# Patient Record
Sex: FEMALE | Employment: OTHER | URBAN - METROPOLITAN AREA
[De-identification: names, ages, dates, MRNs, and addresses within clinical notes are randomized per-mention and may not be internally consistent; named-entity substitution may affect disease eponyms.]

---

## 2017-03-11 DIAGNOSIS — F41.1 GENERALIZED ANXIETY DISORDER: ICD-10-CM

## 2017-03-11 NOTE — LETTER
March 13, 2017      TO: Olive Drew  [address] [place] [id number]         Dear Ms. Olive Drew,  This letter is a reminder that you are overdue to see your Primary Care Provider for an Annual Visit and needed labs. You must be seen by your Primary Care Provider on a yearly basis and have appropriate labs drawn for continued care and prescription refills. Please call [phone] to schedule an appointment for an Annual Visit with  MIRNA MORROW MD.   LAST SEEN  3/15/16    You have been given a 30 day supply/refill of your citalopram  (CELEXA) 10 MG   while you get your clinic visit/labs completed.      Regards,  Primary Care Center

## 2017-03-13 RX ORDER — CITALOPRAM HYDROBROMIDE 10 MG/1
10 TABLET ORAL DAILY
Qty: 90 TABLET | Refills: 0 | Status: SHIPPED | OUTPATIENT
Start: 2017-03-13 | End: 2017-04-25

## 2017-03-13 NOTE — TELEPHONE ENCOUNTER
citalopram (CELEXA) 10 MG      Last Written Prescription Date:  3/15/16  Last Fill Quantity: 90,   # refills: 3  Last Office Visit : 3/15/16  Future Office visit:  NONE  OVERDUE     OVERDUE MD APPT. REMINDER LETTER SENT + 30 DAY RF

## 2017-04-25 ENCOUNTER — OFFICE VISIT (OUTPATIENT)
Dept: INTERNAL MEDICINE | Facility: CLINIC | Age: 72
End: 2017-04-25

## 2017-04-25 VITALS
DIASTOLIC BLOOD PRESSURE: 75 MMHG | BODY MASS INDEX: 39.51 KG/M2 | SYSTOLIC BLOOD PRESSURE: 108 MMHG | WEIGHT: 216 LBS | HEART RATE: 99 BPM

## 2017-04-25 DIAGNOSIS — Z78.0 ASYMPTOMATIC POSTMENOPAUSAL STATUS: ICD-10-CM

## 2017-04-25 DIAGNOSIS — Z11.59 NEED FOR HEPATITIS C SCREENING TEST: ICD-10-CM

## 2017-04-25 DIAGNOSIS — Z23 NEED FOR PROPHYLACTIC VACCINATION WITH TETANUS-DIPHTHERIA (TD): ICD-10-CM

## 2017-04-25 DIAGNOSIS — Z23 NEED FOR PROPHYLACTIC VACCINATION AGAINST STREPTOCOCCUS PNEUMONIAE (PNEUMOCOCCUS): ICD-10-CM

## 2017-04-25 DIAGNOSIS — F41.1 GENERALIZED ANXIETY DISORDER: ICD-10-CM

## 2017-04-25 DIAGNOSIS — Z12.31 VISIT FOR SCREENING MAMMOGRAM: ICD-10-CM

## 2017-04-25 LAB — HCV AB SERPL QL IA: NORMAL

## 2017-04-25 RX ORDER — CITALOPRAM HYDROBROMIDE 10 MG/1
10 TABLET ORAL DAILY
Qty: 90 TABLET | Refills: 3 | Status: SHIPPED | OUTPATIENT
Start: 2017-04-25 | End: 2018-06-04

## 2017-04-25 ASSESSMENT — PAIN SCALES - GENERAL: PAINLEVEL: NO PAIN (0)

## 2017-04-25 NOTE — PATIENT INSTRUCTIONS
Primary Care Center Medication Refill Request Information:  * Please contact your pharmacy regarding ANY request for medication refills.  ** [facility] Prescription Fax = [phone]  * Please allow 3 business days for routine medication refills.  * Please allow 5 business days for controlled substance medication refills.     Primary Care Center Test Result notification information:  *You will be notified within 7-10 days of your appointment day regarding the results of your test.  If you are on MyChart you will be notified as soon as the provider has reviewed the results and signed off on them.    Please schedule the following appointments:  Radiology (Imaging Center) [phone] (1st Floor [facility]

## 2017-04-25 NOTE — MR AVS SNAPSHOT
After Visit Summary   4/25/2017    Olive Drew    MRN: [id number]           Patient Information     Date Of Birth          1945        Visit Information        Provider Department      4/25/2017 3:30 PM Coby Cerrato MD [facility]        Today's Diagnoses     Asymptomatic postmenopausal status        Need for hepatitis C screening test        Visit for screening mammogram        Need for prophylactic vaccination against Streptococcus pneumoniae (pneumococcus)        Need for prophylactic vaccination with tetanus-diphtheria (TD)        Generalized anxiety disorder          Care Instructions    Primary Care Center Medication Refill Request Information:  * Please contact your pharmacy regarding ANY request for medication refills.  ** [facility] Prescription Fax = [phone]  * Please allow 3 business days for routine medication refills.  * Please allow 5 business days for controlled substance medication refills.     Primary Care Center Test Result notification information:  *You will be notified within 7-10 days of your appointment day regarding the results of your test.  If you are on MyChart you will be notified as soon as the provider has reviewed the results and signed off on them.    Please schedule the following appointments:  Radiology (Imaging Center) [phone] (1st [facility]         Follow-ups after your visit        Future tests that were ordered for you today     Open Future Orders        Priority Expected Expires Ordered    DEXA HIP/PELVIS/SPINE - Future Routine  4/25/2018 4/25/2017    Hepatitis C Screen Reflex to HCV RNA Quant and Genotype Routine 4/25/2017 4/25/2018 4/25/2017    MA SCREENING DIGITAL BILAT - Future  (s+30) Routine  4/25/2018 4/25/2017            Who to contact     Please call your clinic at [phone] to:    Ask questions about your health    Make or cancel appointments    Discuss your medicines    Learn about your test  results    Speak to your doctor   If you have compliments or concerns about an experience at your clinic, or if you wish to file a complaint, please contact [facility] Patient Relations at [phone] or email us at [email or web address]         Additional Information About Your Visit        Freepathhart Information     Poundworldt gives you secure access to your electronic health record. If you see a primary care provider, you can also send messages to your care team and make appointments. If you have questions, please call your primary care clinic.  If you do not have a primary care provider, please call [phone] and they will assist you.      DepoMed is an electronic gateway that provides easy, online access to your medical records. With [employer]oMed, you can request a clinic appointment, read your test results, renew a prescription or communicate with your care team.     To access your existing account, please contact your [facility] or call [phone] for assistance.        Care EveryWhere ID     This is your Care EveryWhere ID. This could be used by other organizations to access your [place] medical records  [id number]        Your Vitals Were     Pulse BMI (Body Mass Index)                99 39.51 kg/m2           Blood Pressure from Last 3 Encounters:   04/25/17 108/75   03/15/16 128/83   05/06/14 140/88    Weight from Last 3 Encounters:   04/25/17 98 kg (216 lb)   03/15/16 107.5 kg (237 lb)   05/06/14 106 kg (233 lb 11.2 oz)              We Performed the Following     Pneumococcal vaccine 13 valent PCV13 IM (Prevnar) [78709]     TDAP ( BOOSTRIX AGES 10-64)          Where to get your medicines      These medications were sent to [id number] - [facility] [address] AT [facility] [address]  [address] [place] [place] [address]    Hours:  24-hours Phone:  [phone]     citalopram 10 MG tablet           Primary Care Provider Office Phone # Fax #    Coby Ivy Menjivar [phone]       [address] [place] [address]        Thank you!     Thank you for choosing [facility]INIC  for your care. Our goal is always to provide you with excellent care. Hearing back from our patients is one way we can continue to improve our services. Please take a few minutes to complete the written survey that you may receive in the mail after your visit with us. Thank you!             Your Updated Medication List - Protect others around you: Learn how to safely use, store and throw away your medicines at www.disposemymeds.org.          This list is accurate as of: 4/25/17  3:37 PM.  Always use your most recent med list.                   Brand Name Dispense Instructions for use    ADVIL 200 MG tablet   Generic drug:  ibuprofen      Take 200 mg by mouth every 4 hours as needed.       citalopram 10 MG tablet    celeXA    90 tablet    Take 1 tablet (10 mg) by mouth daily LETTER SENT / MD APPT. NEEDED  FOR REFILLS       LORazepam 0.5 MG tablet    ATIVAN    15 tablet    Take 1 tablet (0.5 mg) by mouth every 6 hours as needed for anxiety       METAMUCIL 30.9 % Powd   Generic drug:  psyllium      Take  by mouth daily.       ondansetron 4 MG ODT tab    ZOFRAN-ODT    30 tablet    Take 1 tablet (4 mg) by mouth every 8 hours as needed for nausea       PRILOSEC PO      1 tablet daily.       TUMS 500 MG chewable tablet   Generic drug:  calcium carbonate      as needed.

## 2017-04-25 NOTE — NURSING NOTE
Chief Complaint   Patient presents with     Recheck Medication     Pt is here to follow up on her medications.      Rosy Lazo LPN April 25, 2017 3:11 PM

## 2017-04-26 NOTE — PROGRESS NOTES
Olive was seen today for annual physical, follow up of depression.  She is doing quite well, no concerns.  Would like to stay on the low dose of citalopram as it helps her mood significantly and no side effects noted.    Past, family, social history unchanged per Epic.   ROS: 10 point ROS neg other than the symptoms noted above in the HPI.    PHYSICAL EXAM:  B/P: 108/75, P: 99  Constitutional: no distress, comfortable, pleasant   Eyes: anicteric, normal extra-ocular movements   Ears, Nose and Throat: tympanic membranes clear, nose clear and free of lesions, throat clear, neck supple with full range of motion, no thyromegaly.   Cardiovascular: regular rate and rhythm, normal S1 and S2, no murmurs, rubs or gallops,  peripheral pulses full and symmetric   Respiratory: clear to auscultation, no wheezes or crackles, normal breath sounds   Gastrointestinal: positive bowel sounds, nontender, no hepatosplenomegaly, no masses   Musculoskeletal: full range of motion, no active joints   Skin: no concerning lesions, no jaundice   Neurological: normal gait, no tremor   Psychological: appropriate mood   Lymphatic: no cervical lymphadenopathy and no pedal edema    A/P  72 year old here for routine physical and depression follow up, doing well    Asymptomatic postmenopausal status  -     DEXA HIP/PELVIS/SPINE - Future; Future    Need for hepatitis C screening test  -     Hepatitis C Screen Reflex to HCV RNA Quant and Genotype; Future    Visit for screening mammogram  -     MA SCREENING DIGITAL BILAT - Future  (s+30); Future    Need for prophylactic vaccination against Streptococcus pneumoniae (pneumococcus)  -     Pneumococcal vaccine 13 valent PCV13 IM (Prevnar) [[id number]]    Need for prophylactic vaccination with tetanus-diphtheria (TD)  -     TDAP ( BOOSTRIX AGES 10-64)    Generalized anxiety disorder  -     citalopram (CELEXA) 10 MG tablet; Take 1 tablet (10 mg) by mouth daily LETTER SENT / MD APPT. NEEDED  FOR REFILLS    RTC one  year or prn    Tiara Mayes MD

## 2017-06-08 ENCOUNTER — RADIANT APPOINTMENT (OUTPATIENT)
Dept: MAMMOGRAPHY | Facility: CLINIC | Age: 72
End: 2017-06-08
Attending: INTERNAL MEDICINE

## 2017-06-08 DIAGNOSIS — Z12.31 VISIT FOR SCREENING MAMMOGRAM: ICD-10-CM

## 2018-06-04 DIAGNOSIS — F41.1 GENERALIZED ANXIETY DISORDER: ICD-10-CM

## 2018-06-05 ENCOUNTER — TELEPHONE (OUTPATIENT)
Dept: INTERNAL MEDICINE | Facility: CLINIC | Age: 73
End: 2018-06-05

## 2018-06-05 RX ORDER — CITALOPRAM HYDROBROMIDE 10 MG/1
10 TABLET ORAL DAILY
Qty: 30 TABLET | Refills: 0 | Status: SHIPPED | OUTPATIENT
Start: 2018-06-05 | End: 2018-07-13

## 2018-06-05 NOTE — TELEPHONE ENCOUNTER
Left a message for patient to call back to schedule annual clinic visit with Dr. Gerber Menjivar in Primary Care. Gave clinic number for rescheduling.

## 2018-06-05 NOTE — TELEPHONE ENCOUNTER
citalopram (CELEXA) 10 MG tablet  Last Written Prescription Date:  4/25/17  Last Fill Quantity: 90   # refills: 3  Last Office Visit : 4/25/17  Future Office visit:  None    Scheduling has been notified to contact the pt for appointment.

## 2018-06-05 NOTE — TELEPHONE ENCOUNTER
----- Message from Sandy Jenkins RN sent at 6/5/2018  4:34 PM CDT -----  Pt    RajendraOlive [[id number]]    [facility]    Gerber    Please call to schedule.    Patient is overdue for annual clinic visit - unless otherwise indicated patient needs to be scheduled with 1st available.    Patient may need labs and or imaging - please check with RN care coordinator.    Thanks    I do not need any follow up on the scheduling of this appointment unless the patient will no longer be receiving care in our clinic.

## 2018-07-13 ENCOUNTER — TELEPHONE (OUTPATIENT)
Dept: INTERNAL MEDICINE | Facility: CLINIC | Age: 73
End: 2018-07-13

## 2018-07-13 ENCOUNTER — OFFICE VISIT (OUTPATIENT)
Dept: INTERNAL MEDICINE | Facility: CLINIC | Age: 73
End: 2018-07-13
Payer: COMMERCIAL

## 2018-07-13 VITALS
SYSTOLIC BLOOD PRESSURE: 114 MMHG | HEART RATE: 81 BPM | RESPIRATION RATE: 18 BRPM | WEIGHT: 212.9 LBS | DIASTOLIC BLOOD PRESSURE: 78 MMHG | BODY MASS INDEX: 38.94 KG/M2

## 2018-07-13 DIAGNOSIS — Z13.220 SCREENING FOR LIPID DISORDERS: ICD-10-CM

## 2018-07-13 DIAGNOSIS — R11.14 BILIOUS VOMITING WITH NAUSEA: ICD-10-CM

## 2018-07-13 DIAGNOSIS — F41.1 GENERALIZED ANXIETY DISORDER: ICD-10-CM

## 2018-07-13 DIAGNOSIS — R11.14 BILIOUS VOMITING WITH NAUSEA: Primary | ICD-10-CM

## 2018-07-13 DIAGNOSIS — M25.512 CHRONIC LEFT SHOULDER PAIN: ICD-10-CM

## 2018-07-13 DIAGNOSIS — Z13.220 SCREENING FOR LIPID DISORDERS: Primary | ICD-10-CM

## 2018-07-13 DIAGNOSIS — Z12.31 ENCOUNTER FOR SCREENING MAMMOGRAM FOR BREAST CANCER: ICD-10-CM

## 2018-07-13 DIAGNOSIS — G89.29 CHRONIC LEFT SHOULDER PAIN: ICD-10-CM

## 2018-07-13 DIAGNOSIS — M19.90 ARTHRITIS: ICD-10-CM

## 2018-07-13 LAB
ALBUMIN SERPL-MCNC: 3.4 G/DL (ref 3.4–5)
ALP SERPL-CCNC: 82 U/L (ref 40–150)
ALT SERPL W P-5'-P-CCNC: 18 U/L (ref 0–50)
ANION GAP SERPL CALCULATED.3IONS-SCNC: 6 MMOL/L (ref 3–14)
AST SERPL W P-5'-P-CCNC: 18 U/L (ref 0–45)
BILIRUB SERPL-MCNC: 0.4 MG/DL (ref 0.2–1.3)
BUN SERPL-MCNC: 20 MG/DL (ref 7–30)
CALCIUM SERPL-MCNC: 8.7 MG/DL (ref 8.5–10.1)
CHLORIDE SERPL-SCNC: 108 MMOL/L (ref 94–109)
CHOLEST SERPL-MCNC: 242 MG/DL
CO2 SERPL-SCNC: 26 MMOL/L (ref 20–32)
CREAT SERPL-MCNC: 0.77 MG/DL (ref 0.52–1.04)
GFR SERPL CREATININE-BSD FRML MDRD: 74 ML/MIN/1.7M2
GLUCOSE SERPL-MCNC: 104 MG/DL (ref 70–99)
HDLC SERPL-MCNC: 49 MG/DL
LDLC SERPL CALC-MCNC: 166 MG/DL
NONHDLC SERPL-MCNC: 193 MG/DL
POTASSIUM SERPL-SCNC: 4.4 MMOL/L (ref 3.4–5.3)
PROT SERPL-MCNC: 7.2 G/DL (ref 6.8–8.8)
SODIUM SERPL-SCNC: 140 MMOL/L (ref 133–144)
TRIGL SERPL-MCNC: 133 MG/DL
TSH SERPL DL<=0.005 MIU/L-ACNC: 1.65 MU/L (ref 0.4–4)

## 2018-07-13 RX ORDER — ONDANSETRON 4 MG/1
4-8 TABLET, ORALLY DISINTEGRATING ORAL EVERY 8 HOURS PRN
Qty: 20 TABLET | Refills: 1 | Status: SHIPPED | OUTPATIENT
Start: 2018-07-13

## 2018-07-13 RX ORDER — ONDANSETRON 4 MG/1
4-8 TABLET, FILM COATED ORAL EVERY 8 HOURS PRN
Qty: 20 TABLET | Refills: 1 | Status: SHIPPED | OUTPATIENT
Start: 2018-07-13

## 2018-07-13 RX ORDER — TRAMADOL HYDROCHLORIDE 50 MG/1
50 TABLET ORAL EVERY 6 HOURS PRN
Qty: 15 TABLET | Refills: 5 | Status: SHIPPED | OUTPATIENT
Start: 2018-07-13 | End: 2020-10-23

## 2018-07-13 RX ORDER — CITALOPRAM HYDROBROMIDE 10 MG/1
10 TABLET ORAL DAILY
Qty: 90 TABLET | Refills: 3 | Status: SHIPPED | OUTPATIENT
Start: 2018-07-13 | End: 2019-06-28

## 2018-07-13 ASSESSMENT — PAIN SCALES - GENERAL: PAINLEVEL: MODERATE PAIN (5)

## 2018-07-13 NOTE — PATIENT INSTRUCTIONS
Primary Care Center Medication Refill Request Information:  * Please contact your pharmacy regarding ANY request for medication refills.  ** [facility] Prescription Fax = [phone]  * Please allow 3 business days for routine medication refills.  * Please allow 5 business days for controlled substance medication refills.     Primary Care Center Test Result notification information:  *You will be notified with in 7-10 days of your appointment day regarding the results of your test.  If you are on MyChart you will be notified as soon as the provider has reviewed the results and signed off on them.    Primary Care Center [phone]       Physical Therapy [phone]     Breast Center  [phone]  (2nd Floor)

## 2018-07-13 NOTE — TELEPHONE ENCOUNTER
I received a fax from the pharmacy stating ODT form of zofran not covered. Will send regular tab in the meantime and start PA for ODT tablets.    Message left for patient that PA will be started.        Prior Authorization Retail Medication Request    Medication/Dose: ondansetron ODT 4mg tablet  4-8mg Q8H PRN  ICD code (if different than what is on RX):  Bilious vomiting with nausea [R11.14]   Previously Tried and Failed:    Rationale:  Patient has had successful symptom management with ODT tablet, request to continue current therapy    Insurance Name:    Insurance ID:        Pharmacy Information (if different than what is on RX)  Name:    Phone:

## 2018-07-13 NOTE — MR AVS SNAPSHOT
"              After Visit Summary   7/13/2018    Olive Drew    MRN: [id number]           Patient Information     Date Of Birth          1945        Visit Information        Provider Department      7/13/2018 8:00 AM Coby Cerrato MD [facility]        Today's Diagnoses     Screening for lipid disorders    -  1    Generalized anxiety disorder        Bilious vomiting with nausea        Encounter for screening mammogram for breast cancer        Arthritis        Chronic left shoulder pain          Care Instructions    Primary Care Center Medication Refill Request Information:  * Please contact your pharmacy regarding ANY request for medication refills.  ** [facility] Prescription Fax = [phone]  * Please allow 3 business days for routine medication refills.  * Please allow 5 business days for controlled substance medication refills.     Primary Care Center Test Result notification information:  *You will be notified with in 7-10 days of your appointment day regarding the results of your test.  If you are on MyChart you will be notified as soon as the provider has reviewed the results and signed off on them.    Primary Care Center [phone]       Physical Therapy [phone]     Breast Center  [phone]  (2nd Floor)                  Follow-ups after your visit        Additional Services     PHYSICAL THERAPY REFERRAL       Please be aware that coverage of these services is subject to the terms and limitations of your health insurance plan.  Call member services at your health plan with any benefit or coverage questions.      **Note to Provider:  If you are referring outside of [place] for the therapy appointment, please list the name of the location in the \"special instructions\" above, print the referral and give to the patient to schedule the appointment.                  Future tests that were ordered for you today     Open Future Orders        Priority Expected Expires Ordered "    Mammogram, screening w linda (3D) Routine  7/13/2019 7/13/2018    Lipid panel reflex to direct LDL Fasting Routine 7/13/2018 7/27/2018 7/13/2018    Comprehensive metabolic panel Routine 7/13/2018 7/27/2018 7/13/2018    TSH with free T4 reflex Routine 7/13/2018 7/27/2018 7/13/2018            Who to contact     Please call your clinic at [phone] to:    Ask questions about your health    Make or cancel appointments    Discuss your medicines    Learn about your test results    Speak to your doctor            Additional Information About Your Visit        PriviaharSingular Information     V[employer] Critical gives you secure access to your electronic health record. If you see a primary care provider, you can also send messages to your care team and make appointments. If you have questions, please call your primary care clinic.  If you do not have a primary care provider, please call [phone] and they will assist you.      V[employer] Critical is an electronic gateway that provides easy, online access to your medical records. With [employer], you can request a clinic appointment, read your test results, renew a prescription or communicate with your care team.     To access your existing account, please contact your [facility] or call [phone] for assistance.        Care EveryWhere ID     This is your Care EveryWhere ID. This could be used by other organizations to access your [place] medical records  [id number]        Your Vitals Were     Pulse Respirations Breastfeeding? BMI (Body Mass Index)          81 18 No 38.94 kg/m2         Blood Pressure from Last 3 Encounters:   07/13/18 114/78   04/25/17 108/75   03/15/16 128/83    Weight from Last 3 Encounters:   07/13/18 96.6 kg (212 lb 14.4 oz)   04/25/17 98 kg (216 lb)   03/15/16 107.5 kg (237 lb)              We Performed the Following     PHYSICAL THERAPY REFERRAL          Today's Medication Changes          These changes are accurate as of 7/13/18  8:47 AM.  If  you have any questions, ask your nurse or doctor.               These medicines have changed or have updated prescriptions.        Dose/Directions    ondansetron 4 MG ODT tab   Commonly known as:  ZOFRAN ODT   This may have changed:  how much to take   Used for:  Bilious vomiting with nausea   Changed by:  Coby Cerrato MD        Dose:  4-8 mg   Take 1-2 tablets (4-8 mg) by mouth every 8 hours as needed for nausea   Quantity:  20 tablet   Refills:  1       traMADol 50 MG tablet   Commonly known as:  ULTRAM   This may have changed:    - how much to take  - reasons to take this   Used for:  Arthritis   Changed by:  Coby Cerrato MD        Dose:  50 mg   Take 1 tablet (50 mg) by mouth every 6 hours as needed for severe pain   Quantity:  15 tablet   Refills:  5         Stop taking these medicines if you haven't already. Please contact your care team if you have questions.     LORazepam 0.5 MG tablet   Commonly known as:  ATIVAN   Stopped by:  Coby Cerrato MD                Where to get your medicines      These medications were sent to [facility] [address] [place] [address] [address] [facility] [address]  [address] [facility] [address]     Phone:  [phone]     citalopram 10 MG tablet    ondansetron 4 MG ODT tab         Some of these will need a paper prescription and others can be bought over the counter.  Ask your nurse if you have questions.     Bring a paper prescription for each of these medications     traMADol 50 MG tablet               Information about OPIOIDS     PRESCRIPTION OPIOIDS: WHAT YOU NEED TO KNOW   We gave you an opioid (narcotic) pain medicine. It is important to manage your pain, but opioids are not always the best choice. You should first try all the other options your care team gave you. Take this medicine for as short a time (and as few doses) as possible.     These medicines have risks:    DO NOT drive  when on new or higher doses of pain medicine. These medicines can affect your alertness and reaction times, and you could be arrested for driving under the influence (DUI). If you need to use opioids long-term, talk to your care team about driving.    DO NOT operate heave machinery    DO NOT do any other dangerous activities while taking these medicines.     DO NOT drink any alcohol while taking these medicines.      If the opioid prescribed includes acetaminophen, DO NOT take with any other medicines that contain acetaminophen. Read all labels carefully. Look for the word  acetaminophen  or  Tylenol.  Ask your pharmacist if you have questions or are unsure.    You can get addicted to pain medicines, especially if you have a history of addiction (chemical, alcohol or substance dependence). Talk to your care team about ways to reduce this risk.    Store your pills in a secure place, locked if possible. We will not replace any lost or stolen medicine. If you don t finish your medicine, please throw away (dispose) as directed by your pharmacist. The [place] Pollution Control Agency has more information about safe disposal: https://www.pca.[facility].mn.us/living-green/managing-unwanted-medications.     All opioids tend to cause constipation. Drink plenty of water and eat foods that have a lot of fiber, such as fruits, vegetables, prune juice, apple juice and high-fiber cereal. Take a laxative (Miralax, milk of magnesia, Colace, Senna) if you don t move your bowels at least every other day.          Primary Care Provider Office Phone # Fax #    Coby Menjivar [phone]       0 [address] [address]        Equal Access to Services     [facility]: Hadii jessie cross Sovi, waaxda luqadaha, qaybta kaalmada wilfrid hay. So [facility] [phone].    ATENCIÓN: Si habla español, tiene a tena disposición servicios gratuitos de asistencia  lingüísticaChapis Machado al [phone].    We comply with applicable federal civil rights laws and [place] laws. We do not discriminate on the basis of race, color, national origin, age, disability, sex, sexual orientation, or gender identity.            Thank you!     Thank you for choosing [facility] PRIMARY CARE CLINIC  for your care. Our goal is always to provide you with excellent care. Hearing back from our patients is one way we can continue to improve our services. Please take a few minutes to complete the written survey that you may receive in the mail after your visit with us. Thank you!             Your Updated Medication List - Protect others around you: Learn how to safely use, store and throw away your medicines at www.disposemymeds.org.          This list is accurate as of 7/13/18  8:47 AM.  Always use your most recent med list.                   Brand Name Dispense Instructions for use Diagnosis    ADVIL 200 MG tablet   Generic drug:  ibuprofen      Take 200 mg by mouth every 4 hours as needed.        citalopram 10 MG tablet    celeXA    90 tablet    Take 1 tablet (10 mg) by mouth daily    Generalized anxiety disorder       METAMUCIL 30.9 % Powd   Generic drug:  psyllium      Take  by mouth daily.        ondansetron 4 MG ODT tab    ZOFRAN ODT    20 tablet    Take 1-2 tablets (4-8 mg) by mouth every 8 hours as needed for nausea    Bilious vomiting with nausea       PRILOSEC PO      1 tablet daily.    Generalized anxiety disorder, Vomiting alone       traMADol 50 MG tablet    ULTRAM    15 tablet    Take 1 tablet (50 mg) by mouth every 6 hours as needed for severe pain    Arthritis       TUMS 500 MG chewable tablet   Generic drug:  calcium carbonate      as needed.    Generalized anxiety disorder, Vomiting alone

## 2018-07-13 NOTE — NURSING NOTE
Chief Complaint   Patient presents with     Refill Request     Patient is here for follow up and medication refills       Gregor Wheat CMA (AAMA) at 8:03 AM on 7/13/2018

## 2018-07-16 NOTE — TELEPHONE ENCOUNTER
PA Initiation    Medication: Zofran 4mg ODT  Insurance Company: BCBS Platinum Blue - Phone [phone] Fax [phone]  Pharmacy Filling the Rx: [facility] [id number] [place], [place] [address] [address] AT [facility] [address]  Filling Pharmacy Phone: [phone]  Filling Pharmacy Fax: [phone]  Start Date: 7/16/2018

## 2018-07-18 NOTE — TELEPHONE ENCOUNTER
Prior Authorization Approval    Authorization Effective Date: 4/19/2018  Authorization Expiration Date: 7/18/2019  Medication: Zofran 4mg ODT - PA APPROVED  Approved Dose/Quantity:  20/4   Reference #: [id number]   Insurance Company: BCBS Platinum Blue - Phone [phone] Fax [phone]  Expected CoPay:       CoPay Card Available:      Foundation Assistance Needed:    Which Pharmacy is filling the prescription (Not needed for infusion/clinic administered): [facility] [address] [place] [address] [address] AT [facility] OF [address]  Pharmacy Notified: Yes  Patient Notified: Yes    --Verbal approval from insurance

## 2018-07-19 NOTE — PROGRESS NOTES
Olive was seen today for refill request and follow up of nausea, Anxiety and Arthritis.     Total time spent 25 minutes.  More than 50% of the time spent with Ms. Drew on counseling / coordinating her care.    She feels overall her mood is much better on citalopram. She also believes that nausea is actually a symptom of her anxiety; a GI workup done previously was negative.  She does still use zofran as needed, but rarely.    She would like a refill of tramadol for arthritis pain and also her left shoulder has been acting up lately.  We discussed a PT referral.      Finally, she is due for a mammogram and she would also like her cholesterol rechecked.     On exam  B/P: 114/78, P: 81, R: 18  Cor RRR  Lungs CTA  Ext no edema      A/P    Screening for lipid disorders  -     Lipid panel reflex to direct LDL Fasting; Future  -     Comprehensive metabolic panel; Future  -     TSH with free T4 reflex; Future    Generalized anxiety disorder  -     citalopram (CELEXA) 10 MG tablet; Take 1 tablet (10 mg) by mouth daily    Bilious vomiting with nausea  -     ondansetron (ZOFRAN ODT) 4 MG ODT tab; Take 1-2 tablets (4-8 mg) by mouth every 8 hours as needed for nausea    Encounter for screening mammogram for breast cancer  -     Mammogram, screening w linda (3D); Future    Arthritis  -     traMADol (ULTRAM) 50 MG tablet; Take 1 tablet (50 mg) by mouth every 6 hours as needed for severe pain    Chronic left shoulder pain  -     PHYSICAL THERAPY REFERRAL    --Coby Mayes MD

## 2018-07-31 ENCOUNTER — RADIANT APPOINTMENT (OUTPATIENT)
Dept: MAMMOGRAPHY | Facility: CLINIC | Age: 73
End: 2018-07-31
Attending: INTERNAL MEDICINE
Payer: COMMERCIAL

## 2018-07-31 DIAGNOSIS — Z12.31 ENCOUNTER FOR SCREENING MAMMOGRAM FOR BREAST CANCER: ICD-10-CM

## 2019-06-25 ASSESSMENT — ENCOUNTER SYMPTOMS
SMELL DISTURBANCE: 1
HALLUCINATIONS: 0
DISTURBANCES IN COORDINATION: 0
NUMBNESS: 0
POLYDIPSIA: 0
EYE WATERING: 0
TROUBLE SWALLOWING: 0
MEMORY LOSS: 1
EYE PAIN: 0
MYALGIAS: 1
INCREASED ENERGY: 1
PARALYSIS: 0
BACK PAIN: 1
WEIGHT LOSS: 1
DIZZINESS: 0
JOINT SWELLING: 1
WEIGHT GAIN: 0
CHILLS: 1
FEVER: 0
MUSCLE WEAKNESS: 0
WEAKNESS: 0
ALTERED TEMPERATURE REGULATION: 1
POLYPHAGIA: 0
DECREASED APPETITE: 0
HOARSE VOICE: 0
NECK MASS: 1
NECK PAIN: 1
SINUS CONGESTION: 0
SORE THROAT: 0
TREMORS: 0
MUSCLE CRAMPS: 1
DOUBLE VISION: 1
NIGHT SWEATS: 1
TASTE DISTURBANCE: 1
HEADACHES: 1
EYE IRRITATION: 1
SPEECH CHANGE: 0
EYE REDNESS: 0
SINUS PAIN: 1
LOSS OF CONSCIOUSNESS: 0
FATIGUE: 1
STIFFNESS: 1
ARTHRALGIAS: 1
SEIZURES: 0

## 2019-06-25 ASSESSMENT — ACTIVITIES OF DAILY LIVING (ADL)
IN_THE_PAST_7_DAYS,_DID_YOU_NEED_HELP_FROM_OTHERS_TO_PERFORM_EVERYDAY_ACTIVITIES_SUCH_AS_EATING,_GETTING_DRESSED,_GROOMING,_BATHING,_WALKING,_OR_USING_THE_TOILET: N
IN_THE_PAST_7_DAYS,_DID_YOU_NEED_HELP_FROM_OTHERS_TO_TAKE_CARE_OF_THINGS_SUCH_AS_LAUNDRY_AND_HOUSEKEEPING,_BANKING,_SHOPPING,_USING_THE_TELEPHONE,_FOOD_PREPARATION,_TRANSPORTATION,_OR_TAKING_YOUR_OWN_MEDICATIONS?: N

## 2019-06-28 ENCOUNTER — OFFICE VISIT (OUTPATIENT)
Dept: INTERNAL MEDICINE | Facility: CLINIC | Age: 74
End: 2019-06-28
Payer: COMMERCIAL

## 2019-06-28 ENCOUNTER — ANCILLARY PROCEDURE (OUTPATIENT)
Dept: GENERAL RADIOLOGY | Facility: CLINIC | Age: 74
End: 2019-06-28
Attending: INTERNAL MEDICINE
Payer: COMMERCIAL

## 2019-06-28 VITALS
HEIGHT: 62 IN | BODY MASS INDEX: 37.91 KG/M2 | SYSTOLIC BLOOD PRESSURE: 112 MMHG | HEART RATE: 75 BPM | DIASTOLIC BLOOD PRESSURE: 73 MMHG | WEIGHT: 206 LBS | OXYGEN SATURATION: 96 %

## 2019-06-28 DIAGNOSIS — M89.9 DISORDER OF BONE: ICD-10-CM

## 2019-06-28 DIAGNOSIS — Z13.220 SCREENING FOR LIPID DISORDERS: ICD-10-CM

## 2019-06-28 DIAGNOSIS — M25.441 FINGER JOINT SWELLING, RIGHT: ICD-10-CM

## 2019-06-28 DIAGNOSIS — F41.1 GENERALIZED ANXIETY DISORDER: ICD-10-CM

## 2019-06-28 DIAGNOSIS — M89.9 DISORDER OF BONE: Primary | ICD-10-CM

## 2019-06-28 LAB
ALBUMIN SERPL-MCNC: 3.6 G/DL (ref 3.4–5)
ALP SERPL-CCNC: 84 U/L (ref 40–150)
ALT SERPL W P-5'-P-CCNC: 23 U/L (ref 0–50)
ANION GAP SERPL CALCULATED.3IONS-SCNC: 5 MMOL/L (ref 3–14)
AST SERPL W P-5'-P-CCNC: 22 U/L (ref 0–45)
BILIRUB SERPL-MCNC: 0.6 MG/DL (ref 0.2–1.3)
BUN SERPL-MCNC: 17 MG/DL (ref 7–30)
CALCIUM SERPL-MCNC: 8.9 MG/DL (ref 8.5–10.1)
CHLORIDE SERPL-SCNC: 106 MMOL/L (ref 94–109)
CHOLEST SERPL-MCNC: 263 MG/DL
CO2 SERPL-SCNC: 27 MMOL/L (ref 20–32)
CREAT SERPL-MCNC: 0.7 MG/DL (ref 0.52–1.04)
CRP SERPL-MCNC: 6.7 MG/L (ref 0–8)
ERYTHROCYTE [SEDIMENTATION RATE] IN BLOOD BY WESTERGREN METHOD: 15 MM/H (ref 0–30)
GFR SERPL CREATININE-BSD FRML MDRD: 87 ML/MIN/{1.73_M2}
GLUCOSE SERPL-MCNC: 95 MG/DL (ref 70–99)
HDLC SERPL-MCNC: 57 MG/DL
LDLC SERPL CALC-MCNC: 178 MG/DL
NONHDLC SERPL-MCNC: 206 MG/DL
POTASSIUM SERPL-SCNC: 4 MMOL/L (ref 3.4–5.3)
PROT SERPL-MCNC: 7.4 G/DL (ref 6.8–8.8)
RHEUMATOID FACT SER NEPH-ACNC: <20 IU/ML (ref 0–20)
SODIUM SERPL-SCNC: 138 MMOL/L (ref 133–144)
TRIGL SERPL-MCNC: 138 MG/DL
TSH SERPL DL<=0.005 MIU/L-ACNC: 1.88 MU/L (ref 0.4–4)

## 2019-06-28 PROCEDURE — 86431 RHEUMATOID FACTOR QUANT: CPT | Performed by: INTERNAL MEDICINE

## 2019-06-28 RX ORDER — CITALOPRAM HYDROBROMIDE 10 MG/1
10 TABLET ORAL DAILY
Qty: 90 TABLET | Refills: 3 | Status: SHIPPED | OUTPATIENT
Start: 2019-06-28 | End: 2020-07-02

## 2019-06-28 ASSESSMENT — PAIN SCALES - GENERAL: PAINLEVEL: NO PAIN (0)

## 2019-06-28 ASSESSMENT — MIFFLIN-ST. JEOR: SCORE: 1389.72

## 2019-06-28 NOTE — NURSING NOTE
Chief Complaint   Patient presents with     Physical     here for annual physical       LIDIA Orellana 9:10 AM on 6/28/2019.

## 2019-06-28 NOTE — PATIENT INSTRUCTIONS
[facility] Medication Refill Request Information:  * Please contact your pharmacy regarding ANY request for medication refills.  ** [facility] Prescription Fax = [phone]  * Please allow 3 business days for routine medication refills.  * Please allow 5 business days for controlled substance medication refills.     [facility] Test Result notification information:  *You will be notified with in 7-10 days of your appointment day regarding the results of your test.  If you are on MyChart you will be notified as soon as the provider has reviewed the results and signed off on them.    [facility]: [phone]

## 2019-07-04 NOTE — PROGRESS NOTES
"Olive was seen today for yearly physical and follow up of anxiety and low bone density.  She endorses feel anxious from time to time but it is much better since being on Celexa.  She also has some diffuse joint pain and in particular, there is a mildly tender swelling in the digit of her right hand.  She also feels warm on occasion; could be related to anxiety but will also check thyroid function. She has intentionally lost a bit of weight through watching her diet and increasing exercise.    No changes to past, family, or social history. ROS: 10 point ROS neg other than the symptoms noted above in the HPI.    PHYSICAL EXAM:  /73   Pulse 75   Ht 1.562 m (5' 1.5\")   Wt 93.4 kg (206 lb)   SpO2 96%   BMI 38.29 kg/m    Constitutional: no distress, comfortable, pleasant   Eyes: anicteric, normal extra-ocular movements   Ears, Nose and Throat: tympanic membranes clear, nose clear and free of lesions, throat clear, neck supple with full range of motion, no thyromegaly.   Cardiovascular: regular rate and rhythm, normal S1 and S2, no murmurs, rubs or gallops, peripheral pulses full and symmetric   Respiratory: clear to auscultation, no wheezes or crackles, normal breath sounds   Gastrointestinal: positive bowel sounds, nontender, no hepatosplenomegaly, no masses   Musculoskeletal: knees full range of motion, no effusion  Skin: no concerning lesions, no jaundice   Neurological: normal gait, no tremor   Psychological: appropriate mood   Lymphatic: no cervical lymphadenopathy and no pedal edema      A/P  74 year old female here for PE and f/u    Disorder of bone  -     DEXA HIP/PELVIS/SPINE - Future; Future    Generalized anxiety disorder  -     citalopram (CELEXA) 10 MG tablet; Take 1 tablet (10 mg) by mouth daily  -     TSH with free T4 reflex; Future    Screening for lipid disorders  -     Lipid Profile FASTING; Future  -     Comprehensive metabolic panel; Future    Finger joint swelling, right  -     XR Hand Right " G/E 3 Views; Future  -     Rheumatoid factor; Future  -     Erythrocyte sedimentation rate; Future  -     CRP inflammation; Future  -     XR Hand Right G/E 3 Views; Future    RTC based on above results, or for annual physical in one year    -- Coby Mayes MD

## 2019-07-05 ENCOUNTER — ANCILLARY PROCEDURE (OUTPATIENT)
Dept: BONE DENSITY | Facility: CLINIC | Age: 74
End: 2019-07-05
Attending: INTERNAL MEDICINE
Payer: COMMERCIAL

## 2019-07-05 DIAGNOSIS — M89.9 DISORDER OF BONE: ICD-10-CM

## 2019-10-04 ENCOUNTER — HEALTH MAINTENANCE LETTER (OUTPATIENT)
Age: 74
End: 2019-10-04

## 2019-11-11 ENCOUNTER — ANCILLARY PROCEDURE (OUTPATIENT)
Dept: MAMMOGRAPHY | Facility: CLINIC | Age: 74
End: 2019-11-11
Attending: INTERNAL MEDICINE
Payer: COMMERCIAL

## 2019-11-11 DIAGNOSIS — Z12.31 VISIT FOR SCREENING MAMMOGRAM: ICD-10-CM

## 2020-06-29 DIAGNOSIS — F41.1 GENERALIZED ANXIETY DISORDER: ICD-10-CM

## 2020-07-02 RX ORDER — CITALOPRAM HYDROBROMIDE 10 MG/1
10 TABLET ORAL DAILY
Qty: 90 TABLET | Refills: 0 | Status: SHIPPED | OUTPATIENT
Start: 2020-07-02 | End: 2020-09-29

## 2020-07-02 NOTE — TELEPHONE ENCOUNTER
CITALOPRAM 10MG TABLETS     Last Written Prescription Date:  6/28/2019  Last Fill Quantity: 90,   # refills: 3  Last Office Visit : 6/28/2019  Future Office visit:  None    Routing refill request to provider for review/approval because:  Drug-Drug Interaction (HIGH)    Tramadol & Citalopram  Drug-Drug Interaction (HIGH) Tramadol & Ibuprofen        Refer to clinic for review and approval of med?  Office Visit Due        90 day supply pended for approval  Clinic coordinator notified         Luisa Khan RN  Central Triage Red Flags/Med Refills

## 2020-07-02 NOTE — TELEPHONE ENCOUNTER
Tried calling patient, no answered. Left message with Primary Care clinic number requesting patient to call back to schedule annual appointment with PCP Dr. Gerber Menjivar.

## 2020-09-25 DIAGNOSIS — F41.1 GENERALIZED ANXIETY DISORDER: ICD-10-CM

## 2020-09-28 ENCOUNTER — TELEPHONE (OUTPATIENT)
Dept: INTERNAL MEDICINE | Facility: CLINIC | Age: 75
End: 2020-09-28

## 2020-09-28 NOTE — TELEPHONE ENCOUNTER
[facility] Call Center    Phone Message    May a detailed message be left on voicemail: yes     Reason for Call: Medication Refill Request    Has the patient contacted the pharmacy for the refill? Yes   Name of medication being requested: citalopram (CELEXA) 10 MG tablet   Provider who prescribed the medication: Dr Mayes  Pharmacy: [id number] [place], [place] - [address] AT [facility] [address]   Date medication is needed: ASAP - she is out.         Action Taken: Message routed to:  [facility] PCC    Travel Screening: Not Applicable

## 2020-09-29 ENCOUNTER — DOCUMENTATION ONLY (OUTPATIENT)
Dept: CARE COORDINATION | Facility: CLINIC | Age: 75
End: 2020-09-29

## 2020-09-29 RX ORDER — CITALOPRAM HYDROBROMIDE 10 MG/1
TABLET ORAL
Qty: 90 TABLET | Refills: 0 | Status: SHIPPED | OUTPATIENT
Start: 2020-09-29 | End: 2020-10-23

## 2020-09-29 NOTE — TELEPHONE ENCOUNTER
Patient has an appointment on 10/23/20. Last appointment 6/28/19. Citalopram refilled.    Kaycee Sawyer RN ([place])

## 2020-10-23 ENCOUNTER — OFFICE VISIT (OUTPATIENT)
Dept: INTERNAL MEDICINE | Facility: CLINIC | Age: 75
End: 2020-10-23
Payer: COMMERCIAL

## 2020-10-23 VITALS
SYSTOLIC BLOOD PRESSURE: 123 MMHG | WEIGHT: 209.2 LBS | HEART RATE: 91 BPM | DIASTOLIC BLOOD PRESSURE: 88 MMHG | OXYGEN SATURATION: 94 % | BODY MASS INDEX: 38.89 KG/M2

## 2020-10-23 DIAGNOSIS — F41.1 GENERALIZED ANXIETY DISORDER: ICD-10-CM

## 2020-10-23 DIAGNOSIS — M19.90 ARTHRITIS: ICD-10-CM

## 2020-10-23 DIAGNOSIS — B00.1 COLD SORE: Primary | ICD-10-CM

## 2020-10-23 PROCEDURE — G0439 PPPS, SUBSEQ VISIT: HCPCS | Performed by: INTERNAL MEDICINE

## 2020-10-23 RX ORDER — TRAMADOL HYDROCHLORIDE 50 MG/1
50 TABLET ORAL EVERY 6 HOURS PRN
Qty: 15 TABLET | Refills: 5 | Status: SHIPPED | OUTPATIENT
Start: 2020-10-23

## 2020-10-23 RX ORDER — VALACYCLOVIR HYDROCHLORIDE 1 G/1
2000 TABLET, FILM COATED ORAL 2 TIMES DAILY
Qty: 4 TABLET | Refills: 3 | Status: SHIPPED | OUTPATIENT
Start: 2020-10-23 | End: 2020-10-23

## 2020-10-23 RX ORDER — CITALOPRAM HYDROBROMIDE 10 MG/1
10 TABLET ORAL DAILY
Qty: 90 TABLET | Refills: 3 | Status: SHIPPED | OUTPATIENT
Start: 2020-10-23 | End: 2022-01-21

## 2020-10-23 RX ORDER — VALACYCLOVIR HYDROCHLORIDE 1 G/1
2000 TABLET, FILM COATED ORAL 2 TIMES DAILY
Qty: 5 TABLET | Refills: 3 | Status: SHIPPED | OUTPATIENT
Start: 2020-10-23 | End: 2020-10-24

## 2020-10-23 ASSESSMENT — PAIN SCALES - GENERAL: PAINLEVEL: NO PAIN (0)

## 2020-10-23 NOTE — PROGRESS NOTES
Medicare Annual Wellness Visit Previsit note    This 75 year old year old female presents for a  Medicare Wellness Exam.           Medical Care     Have you been to an ER or a hospital in the last year? No  What other specialists or organizations are involved in your medical care?  [facility]  Current providers sharing in care for this patient include:  Patient Care Team       Relationship Specialty Notifications Start End    Coby Cerrato MD PCP - General Internal Medicine  11/10/11     Phone: [phone] Pager: [phone] Fax: [phone] [address] [address]    Coby Cerrato MD Assigned PCP   5/21/20     Phone: [phone] Pager: [phone] Fax: [phone] [address] [address]                 Social History     Marital Status:Single  Who lives in your household? Self  Does your home have any of the following safety concerns? Loose rugs in the hallway, no grab bars in the bathroom, no handrails on the stairs or have poorly lit areas?  No  Do you feel threatened or controlled by a partner, ex-partner or anyone in your life? No  Has anyone hurt you physically, for example by pushing, hitting, slapping or kicking you   or forcing you to have sex? No  Do you need help with the phone, transportation, shopping, preparing meals, housework, laundry, medications or managing money? No   Have you noticed any hearing difficulties? Yes       Risk Behaviors and Healthy Habits     How many servings of fruits and vegetables do you eat a day? 3-4  How often do you exercise and what do you do? 45-70 minutes 7 days a week  Do you frequently ride without a seatbelt? No  Do you use tobacco?  No  Do you use any other drugs? No       Do you use alcohol? No        Sexual Health     Are you sexually active? No   If yes, with men, women, or both? N/A  If yes, do you more than one current partner?N/A  If yes, do you use condoms?  "N/A  Have you had any sexually transmitted infections? No  Any sexual concerns? No       FOR WOMEN ONLY  What year did you stop having periods? 1997  Any vaginal bleeding in the last year? No  Have you ever had an abnormal Pap smear? No    PHQ-2 Score:     PHQ-2 ( 1999 [employer]) 10/23/2020 6/28/2019   Q1: Little interest or pleasure in doing things 0 0   Q2: Feeling down, depressed or hopeless 0 0   PHQ-2 Score 0 0     Fall Risk Screening:  FALL RISK ASSESSMENT 10/23/2020   Fallen 2 or more times in the past year? No   Any fall with injury in the past year? No       Lexii Fermin, EMT at 9:43 AM sign on 10/23/2020    Olive was seen today for medicare wellness visit.  She is doing \"great\" and 10 point ROS was negative.  She is getting ready to leave for a vacation in [place].  She requests refills of a few medications, would like to continue citalopram for anxiety as well as tramadol and valtrex prn.    No changes to past, family, or social history.  Her lab work last year was all very favorable except cholesterol.  However she is not willing to go on a statin, so I recommend dietary and lifestyle changes and we can recheck next time.    PHYSICAL EXAM:  /88 (BP Location: Right arm, Patient Position: Sitting, Cuff Size: Adult Large)   Pulse 91   Wt 94.9 kg (209 lb 3.2 oz)   SpO2 94%   Breastfeeding No   BMI 38.89 kg/m    Constitutional: no distress, comfortable, pleasant   Eyes: anicteric, normal extra-ocular movements   Ears, Nose and Throat: tympanic membranes clear, nose clear and free of lesions, throat clear, neck supple with full range of motion, no thyromegaly.   Cardiovascular: regular rate and rhythm, normal S1 and S2, no murmurs, rubs or gallops,  peripheral pulses full and symmetric   Respiratory: clear to auscultation, no wheezes or crackles, normal breath sounds   Gastrointestinal: positive bowel sounds, nontender, no hepatosplenomegaly, no masses   Musculoskeletal: full range of motion, no " edema   Skin: no concerning lesions, no jaundice   Neurological: cranial nerves intact, normal strength and sensation, reflexes at patella and biceps normal, normal gait, no tremor   Psychological: appropriate mood   Lymphatic: no cervical, axillary or inguinal lymphadenopathy      A/P  75 year old here for wellness visit.  From a HCM standpoint, she is due for colonoscopy or other alternative colon cancer screening, however she wanted to wait until she returned from her vacation in [place] and also when the pandemic infections start to decline.   We could order FIT testing or cologuard if the procedure becomes a barrier.    Cold sore  -     valACYclovir (VALTREX) 1000 mg tablet; Take 2 tablets (2,000 mg) by mouth 2 times daily for 1 day At onset of a cold sore    Arthritis  -     traMADol (ULTRAM) 50 MG tablet; Take 1 tablet (50 mg) by mouth every 6 hours as needed for severe pain    Generalized anxiety disorder  -     citalopram (CELEXA) 10 MG tablet; Take 1 tablet (10 mg) by mouth daily    RTC in one year or sooner prn    Coby Menjivar MD

## 2021-01-15 ENCOUNTER — HEALTH MAINTENANCE LETTER (OUTPATIENT)
Age: 76
End: 2021-01-15

## 2021-03-05 ENCOUNTER — IMMUNIZATION (OUTPATIENT)
Dept: NURSING | Facility: CLINIC | Age: 76
End: 2021-03-05
Payer: COMMERCIAL

## 2021-03-05 PROCEDURE — 0001A PR COVID VAC PFIZER DIL RECON 30 MCG/0.3 ML IM: CPT

## 2021-03-05 PROCEDURE — 91300 PR COVID VAC PFIZER DIL RECON 30 MCG/0.3 ML IM: CPT

## 2021-03-26 ENCOUNTER — IMMUNIZATION (OUTPATIENT)
Dept: NURSING | Facility: CLINIC | Age: 76
End: 2021-03-26
Attending: INTERNAL MEDICINE
Payer: COMMERCIAL

## 2021-03-26 PROCEDURE — 0002A PR COVID VAC PFIZER DIL RECON 30 MCG/0.3 ML IM: CPT

## 2021-03-26 PROCEDURE — 91300 PR COVID VAC PFIZER DIL RECON 30 MCG/0.3 ML IM: CPT

## 2021-09-03 ENCOUNTER — MYC MEDICAL ADVICE (OUTPATIENT)
Dept: INTERNAL MEDICINE | Facility: CLINIC | Age: 76
End: 2021-09-03

## 2021-09-12 ENCOUNTER — HEALTH MAINTENANCE LETTER (OUTPATIENT)
Age: 76
End: 2021-09-12

## 2021-09-16 ENCOUNTER — LAB (OUTPATIENT)
Dept: LAB | Facility: CLINIC | Age: 76
End: 2021-09-16
Attending: INTERNAL MEDICINE
Payer: COMMERCIAL

## 2021-09-16 DIAGNOSIS — Z20.822 ENCOUNTER FOR LABORATORY TESTING FOR COVID-19 VIRUS: ICD-10-CM

## 2021-09-16 PROCEDURE — U0003 INFECTIOUS AGENT DETECTION BY NUCLEIC ACID (DNA OR RNA); SEVERE ACUTE RESPIRATORY SYNDROME CORONAVIRUS 2 (SARS-COV-2) (CORONAVIRUS DISEASE [COVID-19]), AMPLIFIED PROBE TECHNIQUE, MAKING USE OF HIGH THROUGHPUT TECHNOLOGIES AS DESCRIBED BY CMS-2020-01-R: HCPCS

## 2021-09-16 PROCEDURE — U0005 INFEC AGEN DETEC AMPLI PROBE: HCPCS

## 2021-09-17 LAB — SARS-COV-2 RNA RESP QL NAA+PROBE: NEGATIVE

## 2021-10-01 ENCOUNTER — MYC MEDICAL ADVICE (OUTPATIENT)
Dept: INTERNAL MEDICINE | Facility: CLINIC | Age: 76
End: 2021-10-01

## 2021-10-05 ENCOUNTER — TELEPHONE (OUTPATIENT)
Dept: INTERNAL MEDICINE | Facility: CLINIC | Age: 76
End: 2021-10-05

## 2022-01-02 ENCOUNTER — HEALTH MAINTENANCE LETTER (OUTPATIENT)
Age: 77
End: 2022-01-02

## 2022-01-21 ENCOUNTER — LAB (OUTPATIENT)
Dept: LAB | Facility: CLINIC | Age: 77
End: 2022-01-21
Payer: COMMERCIAL

## 2022-01-21 ENCOUNTER — OFFICE VISIT (OUTPATIENT)
Dept: INTERNAL MEDICINE | Facility: CLINIC | Age: 77
End: 2022-01-21
Payer: COMMERCIAL

## 2022-01-21 VITALS
HEART RATE: 104 BPM | DIASTOLIC BLOOD PRESSURE: 87 MMHG | SYSTOLIC BLOOD PRESSURE: 142 MMHG | RESPIRATION RATE: 16 BRPM | WEIGHT: 190 LBS | OXYGEN SATURATION: 97 % | HEIGHT: 62 IN | BODY MASS INDEX: 34.96 KG/M2

## 2022-01-21 DIAGNOSIS — Z00.00 ROUTINE GENERAL MEDICAL EXAMINATION AT A HEALTH CARE FACILITY: Primary | ICD-10-CM

## 2022-01-21 DIAGNOSIS — Z12.31 ENCOUNTER FOR SCREENING MAMMOGRAM FOR MALIGNANT NEOPLASM OF BREAST: ICD-10-CM

## 2022-01-21 DIAGNOSIS — F41.1 GENERALIZED ANXIETY DISORDER: ICD-10-CM

## 2022-01-21 DIAGNOSIS — Z13.220 SCREENING FOR LIPID DISORDERS: ICD-10-CM

## 2022-01-21 LAB
ALBUMIN SERPL-MCNC: 3.6 G/DL (ref 3.4–5)
ALP SERPL-CCNC: 72 U/L (ref 40–150)
ALT SERPL W P-5'-P-CCNC: 23 U/L (ref 0–50)
ANION GAP SERPL CALCULATED.3IONS-SCNC: 7 MMOL/L (ref 3–14)
AST SERPL W P-5'-P-CCNC: 19 U/L (ref 0–45)
BILIRUB SERPL-MCNC: 0.5 MG/DL (ref 0.2–1.3)
BUN SERPL-MCNC: 18 MG/DL (ref 7–30)
CALCIUM SERPL-MCNC: 8.7 MG/DL (ref 8.5–10.1)
CHLORIDE BLD-SCNC: 108 MMOL/L (ref 94–109)
CHOLEST SERPL-MCNC: 266 MG/DL
CO2 SERPL-SCNC: 28 MMOL/L (ref 20–32)
CREAT SERPL-MCNC: 0.86 MG/DL (ref 0.52–1.04)
FASTING STATUS PATIENT QL REPORTED: YES
GFR SERPL CREATININE-BSD FRML MDRD: 70 ML/MIN/1.73M2
GLUCOSE BLD-MCNC: 95 MG/DL (ref 70–99)
HDLC SERPL-MCNC: 60 MG/DL
LDLC SERPL CALC-MCNC: 177 MG/DL
NONHDLC SERPL-MCNC: 206 MG/DL
POTASSIUM BLD-SCNC: 4.1 MMOL/L (ref 3.4–5.3)
PROT SERPL-MCNC: 7.2 G/DL (ref 6.8–8.8)
SODIUM SERPL-SCNC: 143 MMOL/L (ref 133–144)
TRIGL SERPL-MCNC: 147 MG/DL
TSH SERPL DL<=0.005 MIU/L-ACNC: 1.39 MU/L (ref 0.4–4)

## 2022-01-21 PROCEDURE — 36415 COLL VENOUS BLD VENIPUNCTURE: CPT | Performed by: PATHOLOGY

## 2022-01-21 PROCEDURE — G0439 PPPS, SUBSEQ VISIT: HCPCS | Performed by: INTERNAL MEDICINE

## 2022-01-21 PROCEDURE — 84443 ASSAY THYROID STIM HORMONE: CPT | Performed by: PATHOLOGY

## 2022-01-21 PROCEDURE — 80053 COMPREHEN METABOLIC PANEL: CPT | Performed by: PATHOLOGY

## 2022-01-21 PROCEDURE — 80061 LIPID PANEL: CPT | Performed by: PATHOLOGY

## 2022-01-21 RX ORDER — CITALOPRAM HYDROBROMIDE 10 MG/1
10 TABLET ORAL DAILY
Qty: 90 TABLET | Refills: 3 | Status: SHIPPED | OUTPATIENT
Start: 2022-01-21 | End: 2023-01-12

## 2022-01-21 ASSESSMENT — MIFFLIN-ST. JEOR: SCORE: 1307.14

## 2022-01-21 ASSESSMENT — PAIN SCALES - GENERAL: PAINLEVEL: NO PAIN (0)

## 2022-01-21 NOTE — PROGRESS NOTES
"Olive is a very pleasant 76 year old female who was seen today for physical and medicare wellness exam.    She feels well in general; her only new symptom or concern was persistent leg pain after a situational fall (she was on an escalator, then dropped something, then walked back down to retrieve it and fell.)  She's had problems with low back and leg pain back in 2009 and an MRI done at that time had some significant findings; she eventually had back surgery in 2013 with an Orthopedics group near [place].  I would recommend seeing them again as she may have a disc bulge or other process amenable to injection or surgery.  She's already doing her own home PT and also water aerobics.    She is due for mammo, labs, and colon cancer screening.  Requesting refill of citalopram.    No changes to past, family, or social history and  ROS: 10 point ROS neg other than the symptoms noted above in the HPI.    PHYSICAL EXAM:  BP (!) 142/87 (BP Location: Right arm, Patient Position: Sitting, Cuff Size: Adult Large)   Pulse 104   Resp 16   Ht 1.562 m (5' 1.5\")   Wt 86.2 kg (190 lb)   SpO2 97%   BMI 35.32 kg/m    Constitutional: no distress, comfortable, pleasant   Eyes: anicteric, normal extra-ocular movements   Ears, Nose and Throat: tympanic membranes clear, neck supple with full range of motion, no thyromegaly.   Cardiovascular: regular rate and rhythm, normal S1 and S2, no murmurs, rubs or gallops, peripheral pulses full and symmetric   Respiratory: clear to auscultation, no wheezes or crackles, normal breath sounds   Gastrointestinal: positive bowel sounds, nontender, no hepatosplenomegaly, no masses   Musculoskeletal: knees full range of motion, no effusion noted  Skin: no concerning lesions, no jaundice   Neurological: normal gait, no tremor   Psychological: appropriate mood   Lymphatic: no cervical lymphadenopathy and no pedal edema    A/P 76 year old here for PE and wellness    Routine general medical examination " at a health care facility  -     Adult Gastro Ref - Procedure Only; Future  -     Mammogram, screening w linda (3D); Future    Encounter for screening mammogram for malignant neoplasm of breast   -     Mammogram, screening w linda (3D); Future    Screening for lipid disorders  -     Comprehensive metabolic panel; Future  -     TSH with free T4 reflex; Future  -     Lipid Profile FASTING; Future    Generalized anxiety disorder  -     citalopram (CELEXA) 10 MG tablet; Take 1 tablet (10 mg) by mouth daily    RTC  In one year, sooner as needed    Coby Menjivar MD      Medicare Wellness Health Risk Assessment Questionnaire    What is your marital status?  Single    Who lives in your household?  N/A    Does your home have loose rugs in the hallway:     Yes     Does your home have grab bars in the bathroom:    No    Does your home have handrails on the stairs?  Yes     Does your home have poorly lit areas?    No    How many times have you fallen in the last year?  3    How many times have you been to the Emergency Room in the last year?  0    How many times have you been hospitalized in the last year?  0    Do you feel threatened or controlled by a partner, ex-partner or anyone in your life?   No    Has anyone hurt you physically, for example by pushing, hitting, slapping or kicking you or forcing you to have sex?   No    Are you sexually active?    No    If yes, with men, women, or both?     N/A    If yes, do you more than one current partner?   No    If yes, are you using condoms?    No    Have you had any sexually transmitted infections in the last year?   No    Do you have any sexual concerns?    No    Women: What year did you stop having periods (approximate age)?  52    Women: Any vaginal bleeding in the last year?    No    Women: Have you ever had an abnormal Pap smear?    No    Do you need help with the phone, transportation, shopping, preparing meals, housework, laundry, medications or managing money?    No    Have you noticed any hearing difficulties?   Yes     Do you wear hearing aids?   No    Have you seen a hearing professional such as an audiologist in the last 1 year?   No    Do you have vision difficulty?    Yes     Do you wear glasses or contacts?   Yes     Have you seen an eye doctor in the last 1 year?   Yes     How many servings of fruits and vegetables do you eat a day?  3-4    How often do you exercise in a week?  6    What kind of exercise do you do?  Water aerobics, 1-2 hours    Do you wear a seatbelt when driving or riding in a vehicle?     Yes     Do you use tobacco?    No    Do you use e-cigarettes?    No    Do you use any other drugs?   No         Do you drink alcohol?   Yes     If you drink alcohol, how many drinks per week?  1    Over the last 2 weeks, how often have you been bothered by feeling down, depressed, or hopeless?  Several Days (1)    Over the last 2 weeks, how often have you had little interest or pleasure in doing things?  Several Days (1)    Have you completed an Advance Directives document?  No    If yes, have you given a copy to the clinic?   No    Do you need information on Advance Directives?   Yes       Romy Marquez, EMT, at 9:41 AM on 1/21/2022.

## 2022-02-10 ENCOUNTER — LAB (OUTPATIENT)
Dept: LAB | Facility: CLINIC | Age: 77
End: 2022-02-10
Payer: COMMERCIAL

## 2022-02-10 DIAGNOSIS — Z20.822 ENCOUNTER FOR LABORATORY TESTING FOR COVID-19 VIRUS: ICD-10-CM

## 2022-02-10 PROCEDURE — U0005 INFEC AGEN DETEC AMPLI PROBE: HCPCS

## 2022-02-10 PROCEDURE — U0003 INFECTIOUS AGENT DETECTION BY NUCLEIC ACID (DNA OR RNA); SEVERE ACUTE RESPIRATORY SYNDROME CORONAVIRUS 2 (SARS-COV-2) (CORONAVIRUS DISEASE [COVID-19]), AMPLIFIED PROBE TECHNIQUE, MAKING USE OF HIGH THROUGHPUT TECHNOLOGIES AS DESCRIBED BY CMS-2020-01-R: HCPCS

## 2022-02-11 LAB — SARS-COV-2 RNA RESP QL NAA+PROBE: NEGATIVE

## 2022-02-27 ENCOUNTER — HEALTH MAINTENANCE LETTER (OUTPATIENT)
Age: 77
End: 2022-02-27

## 2022-07-13 ENCOUNTER — TRANSFERRED RECORDS (OUTPATIENT)
Dept: HEALTH INFORMATION MANAGEMENT | Facility: CLINIC | Age: 77
End: 2022-07-13

## 2022-08-16 ENCOUNTER — OFFICE VISIT (OUTPATIENT)
Dept: INTERNAL MEDICINE | Facility: CLINIC | Age: 77
End: 2022-08-16
Payer: COMMERCIAL

## 2022-08-16 ENCOUNTER — LAB (OUTPATIENT)
Dept: LAB | Facility: CLINIC | Age: 77
End: 2022-08-16
Payer: COMMERCIAL

## 2022-08-16 VITALS
BODY MASS INDEX: 35.51 KG/M2 | HEART RATE: 99 BPM | HEIGHT: 62 IN | WEIGHT: 193 LBS | RESPIRATION RATE: 20 BRPM | OXYGEN SATURATION: 99 % | SYSTOLIC BLOOD PRESSURE: 118 MMHG | DIASTOLIC BLOOD PRESSURE: 75 MMHG

## 2022-08-16 DIAGNOSIS — H26.9 CATARACT OF BOTH EYES, UNSPECIFIED CATARACT TYPE: ICD-10-CM

## 2022-08-16 DIAGNOSIS — Z00.00 HEALTHCARE MAINTENANCE: ICD-10-CM

## 2022-08-16 DIAGNOSIS — Z01.818 PREOP GENERAL PHYSICAL EXAM: ICD-10-CM

## 2022-08-16 DIAGNOSIS — Z00.00 HEALTHCARE MAINTENANCE: Primary | ICD-10-CM

## 2022-08-16 LAB
BASOPHILS # BLD AUTO: 0 10E3/UL (ref 0–0.2)
BASOPHILS NFR BLD AUTO: 0 %
EOSINOPHIL # BLD AUTO: 0.2 10E3/UL (ref 0–0.7)
EOSINOPHIL NFR BLD AUTO: 2 %
ERYTHROCYTE [DISTWIDTH] IN BLOOD BY AUTOMATED COUNT: 14 % (ref 10–15)
HCT VFR BLD AUTO: 47 % (ref 35–47)
HGB BLD-MCNC: 15.4 G/DL (ref 11.7–15.7)
IMM GRANULOCYTES # BLD: 0 10E3/UL
IMM GRANULOCYTES NFR BLD: 0 %
LYMPHOCYTES # BLD AUTO: 2.7 10E3/UL (ref 0.8–5.3)
LYMPHOCYTES NFR BLD AUTO: 28 %
MCH RBC QN AUTO: 29.4 PG (ref 26.5–33)
MCHC RBC AUTO-ENTMCNC: 32.8 G/DL (ref 31.5–36.5)
MCV RBC AUTO: 90 FL (ref 78–100)
MONOCYTES # BLD AUTO: 0.6 10E3/UL (ref 0–1.3)
MONOCYTES NFR BLD AUTO: 6 %
NEUTROPHILS # BLD AUTO: 6.3 10E3/UL (ref 1.6–8.3)
NEUTROPHILS NFR BLD AUTO: 64 %
NRBC # BLD AUTO: 0 10E3/UL
NRBC BLD AUTO-RTO: 0 /100
PLATELET # BLD AUTO: 219 10E3/UL (ref 150–450)
RBC # BLD AUTO: 5.23 10E6/UL (ref 3.8–5.2)
WBC # BLD AUTO: 10 10E3/UL (ref 4–11)

## 2022-08-16 PROCEDURE — 36415 COLL VENOUS BLD VENIPUNCTURE: CPT | Performed by: PATHOLOGY

## 2022-08-16 PROCEDURE — 85025 COMPLETE CBC W/AUTO DIFF WBC: CPT | Performed by: PATHOLOGY

## 2022-08-16 PROCEDURE — 99213 OFFICE O/P EST LOW 20 MIN: CPT | Mod: GE | Performed by: STUDENT IN AN ORGANIZED HEALTH CARE EDUCATION/TRAINING PROGRAM

## 2022-08-16 NOTE — NURSING NOTE
Olive Drew is a 77 year old female patient that presents today in clinic for the following:    Chief Complaint   Patient presents with     Pre-Op Exam     Pt here for pre-op     The patient's allergies and medications were reviewed as noted. A set of vitals were recorded as noted without incident. The patient does not have any other questions for the provider.    Romy Marquez, EMT at 12:35 PM on 8/16/2022

## 2022-08-16 NOTE — PROGRESS NOTES
Surgeon: Mone Marrufo MD  Fax number for Preop Evaluation: [phone]  Location of Surgery: [facility]  Date of Surgery: 8/23/22  Procedure: Cataract surgery  History of reaction to anesthesia? No, pt states nausea     Romy Marquez, EMT at 12:28 PM on 8/16/2022

## 2022-08-16 NOTE — PROGRESS NOTES
PRE-OP EVALUATION:  [facility]  Internal Medicine     HPI:      Olive Drew 77 year old female who presents today at the request of Dr Marrufo for preoperative cardiopulmonary risk assessment for the following intended procedure: cataract surgery 08/23 then 09/06    Type of anesthesia anticipated: unsure    Pertinent history:  Bilateral cataract    Cardiac risks: none  Pulmonary risks: none  Exercise tolerance: good  Personal history of bleeding tendencies/disorders: none  Family history of bleeding tendencies/disorders: none  Personal history of adverse anesthesia reactions: none  Family history of adverse anesthesia reactions: none  Personal history of unanticipated surgical complications: none                                                                                                                                                        .     MEDICAL HISTORY:     Patient Active Problem List   Diagnosis     Backache     Benign neoplasm of eyeball, except conjunctiva, cornea, retina, and choroid     Pain in joint, pelvic region and thigh     Senile nuclear sclerosis     Obstructive sleep apnea     Vitreous degeneration     Presbyopia     Anxiety       Past Surgical History:   Procedure Laterality Date     APPENDECTOMY       JOINT REPLACEMENT      both at seperate times.         Family History   Problem Relation Age of Onset     C.A.D. Father        Social History     Tobacco Use     Smoking status: Never Smoker     Smokeless tobacco: Never Used   Substance Use Topics     Alcohol use: Yes     Comment: 1-2 times a month       Current Outpatient Medications   Medication Sig Dispense Refill     calcium carbonate (TUMS) 500 MG chewable tablet as needed.       citalopram (CELEXA) 10 MG tablet Take 1 tablet (10 mg) by mouth daily 90 tablet 3     ibuprofen (ADVIL/MOTRIN) 200 MG tablet Take 200 mg by mouth every 4 hours as needed.       Omeprazole (PRILOSEC PO) 1 tablet daily.       ondansetron (ZOFRAN ODT) 4  "MG ODT tab Take 1-2 tablets (4-8 mg) by mouth every 8 hours as needed for nausea 20 tablet 1     ondansetron (ZOFRAN) 4 MG tablet Take 1-2 tablets (4-8 mg) by mouth every 8 hours as needed for nausea 20 tablet 1     polyethylene glycol-propylene glycol (SYSTANE ULTRA) 0.4-0.3 % SOLN ophthalmic solution Place 1 drop into both eyes every hour as needed for dry eyes       psyllium 30.9 % POWD Take  by mouth daily.       traMADol (ULTRAM) 50 MG tablet Take 1 tablet (50 mg) by mouth every 6 hours as needed for severe pain 15 tablet 5     valACYclovir (VALTREX) 1000 mg tablet Take 2 tablets (2,000 mg) by mouth 2 times daily for 1 day At onset of a cold sore 5 tablet 3       Allergies   Allergen Reactions     Percocet [Oxycodone-Acetaminophen] Other (See Comments)     Memory loss     Xanax [Alprazolam]        Latex Allergy: NO      REVIEW OF SYSTEMS:     Pertinent questions are noted below.  Answers are \"NO\" unless otherwise noted:    1.  - Do you have a history of heart attack, stroke, stent, bypass or surgery on an artery in the head, neck, heart or legs?  2.  - Do you ever have any pain or discomfort in your chest?   3. - Do you have a history of  Heart Failure?  4. - Are you troubled by shortness of breath when: walking on the level, up a slight hill or at night?  5. - Do you currently have a cold, bronchitis or other respiratory infection?  6. - Do you have a cough, shortness of breath or wheezing?  7. - Do you sometimes get pains in the calves of your legs when you walk?  8. - Do you or anyone in your family have previous history of blood clots?  9. - Do you or does anyone in your family have a serious bleeding problem such as prolonged bleeding following surgeries or cuts?  10. - Have you ever had problems with anemia or been told to take iron pills?  11. - Have you had any abnormal blood loss such as black, tarry or bloody stools, or abnormal vaginal bleeding?  12. - Have you ever had a blood transfusion?  13. - " "Have you or any of your relatives ever had problems with anesthesia?  14. - Do you have sleep apnea, excessive snoring or daytime drowsiness?  15. - Do you have any prosthetic heart valves?  16. - Do you have prosthetic joints?  17. - Is there any chance that you may be pregnant?     EXAM:   /75 (BP Location: Right arm, Patient Position: Sitting, Cuff Size: Adult Regular)   Pulse 99   Resp 20   Ht 1.562 m (5' 1.5\")   Wt 87.5 kg (193 lb)   SpO2 99%   BMI 35.88 kg/m      General:  A&Ox3, NAD  Head: atraumatic  Eyes:  Pupils 2-3 mm, sclera white, EOM's full, conjunctiva moist, no periorbital swelling    Ears:  TM's normal, EAC's patent, clear of cerumen  Nose:  Nasal passages clear, turbinates not swollen  Throat/Mouth:  No pharyngeal erythema, exudate, ulcers, oral mucosa and tongue moist, normal hard and soft palate  Neck:  Trachea midline, Full AROM, supple, thyroid smooth, symmetric, not enlarged, no nodules, no neck lymphadenopathy  Lungs:  Clear to auscultation throughout, no wheezes, rhonchi or rales. Normal respiratory effort and no intercostal retractions.  C/V:  Regular rate and rhythm, no murmurs, rubs or gallops.  No JVD, no carotid bruits. Radial and DP pulses 2+, equal and regular.  Abdomen:  Not distended.  Bowel sounds active.  No tenderness, no hepatosplenomegaly or masses.  No CVA tenderness or masses.  Lymph:  No cervical lymph nodes.  Neuro: Alert and oriented, face symmetric. Able to get on/off exam table without assistance.  Strength grossly intact. No tremor.  Gait steady.   M/S:   No joint deformities noted.  No joint swelling.  Skin:   Normal temperature., turgor and texture. No rashes, suspicious lesions, or jaundice on exposed skin surfaces.   Extremities:  No peripheral edema, no digital cyanosis  Psych:  Alert and oriented. Appropriate affect.  Not psychomotor slowed.  No signs of anxiety or agitation.      IMPRESSION:     The proposed surgical procedure is considered to be LOW " risk.  Patient has a LOW cardiovascular risk and a LOW pulmonary risk profile        Diagnoses for the visit today:  Olive was seen today for pre-op exam.    Diagnoses and all orders for this visit:    Healthcare maintenance  -     CBC with platelets differential; Future      RECOMMENDATIONS:     Olive Drew may proceed with proposed procedure, with the following diagnositic tests and/or specialist consultation(s):  CBC    Instructions for home medications anival-operatively discussed with patient verbally and in writing    Routine follow up with the patient's primary care provider is advised    Discussed with Dr Lyman.    Giovanna Arellano MD  PGY-3 internal medicine    While the patient was in clinic, I reviewed the pertinent medical history and results.  I discussed the current findings on physical examination, as well as the patient s diagnosis and treatment plan with the resident and agree with the information as documented with the following exceptions: none.  Olive Lyman MD  Internal Medicine

## 2022-09-07 ENCOUNTER — TRANSFERRED RECORDS (OUTPATIENT)
Dept: HEALTH INFORMATION MANAGEMENT | Facility: CLINIC | Age: 77
End: 2022-09-07

## 2022-10-28 ENCOUNTER — TRANSFERRED RECORDS (OUTPATIENT)
Dept: HEALTH INFORMATION MANAGEMENT | Facility: CLINIC | Age: 77
End: 2022-10-28

## 2022-11-19 ENCOUNTER — HEALTH MAINTENANCE LETTER (OUTPATIENT)
Age: 77
End: 2022-11-19

## 2022-12-15 NOTE — NURSING NOTE
Olive Drew is a 76 year old female patient that presents today in clinic for the following:    Chief Complaint   Patient presents with     Physical     Pt comes into clinic for annual physical, would like to discuss back and hip issues     The patient's allergies and medications were reviewed as noted. A set of vitals were recorded as noted without incident. The patient does not have any other questions for the provider.    Romy Marquez, EMT at 8:57 AM on 1/21/2022   unk

## 2023-04-07 ENCOUNTER — LAB (OUTPATIENT)
Dept: INTERNAL MEDICINE | Facility: CLINIC | Age: 78
End: 2023-04-07

## 2023-04-07 ENCOUNTER — OFFICE VISIT (OUTPATIENT)
Dept: INTERNAL MEDICINE | Facility: CLINIC | Age: 78
End: 2023-04-07
Payer: COMMERCIAL

## 2023-04-07 VITALS
OXYGEN SATURATION: 97 % | DIASTOLIC BLOOD PRESSURE: 84 MMHG | SYSTOLIC BLOOD PRESSURE: 130 MMHG | WEIGHT: 199.8 LBS | HEART RATE: 91 BPM | BODY MASS INDEX: 37.14 KG/M2

## 2023-04-07 DIAGNOSIS — Z12.11 SPECIAL SCREENING FOR MALIGNANT NEOPLASMS, COLON: Primary | ICD-10-CM

## 2023-04-07 DIAGNOSIS — Z12.31 ENCOUNTER FOR SCREENING MAMMOGRAM FOR BREAST CANCER: ICD-10-CM

## 2023-04-07 DIAGNOSIS — F41.1 GENERALIZED ANXIETY DISORDER: ICD-10-CM

## 2023-04-07 DIAGNOSIS — Z12.11 SPECIAL SCREENING FOR MALIGNANT NEOPLASMS, COLON: ICD-10-CM

## 2023-04-07 PROCEDURE — 99214 OFFICE O/P EST MOD 30 MIN: CPT | Performed by: INTERNAL MEDICINE

## 2023-04-07 RX ORDER — CITALOPRAM HYDROBROMIDE 10 MG/1
10 TABLET ORAL DAILY
Qty: 90 TABLET | Refills: 3 | Status: SHIPPED | OUTPATIENT
Start: 2023-04-07 | End: 2024-04-12

## 2023-04-07 NOTE — NURSING NOTE
Olive Drew is a 78 year old female that presents in clinic today for the following:     Chief Complaint   Patient presents with     Refill Request     Pt here to refill medications       The patient's allergies and medications were reviewed. The patient's vitals were obtained without incident. The patient does not have any other questions for the provider.     Agustin Conte, EMT at 9:29 AM on 4/7/2023.  Primary Care Clinic: [phone]

## 2023-04-07 NOTE — PROGRESS NOTES
Olive is a very pleasant 78 year old female who was seen today for refill request.  She feels citalopram is working well for anxiety and would like to continue it.  She is taking a low dose with no side effects.    We also discussed covid; she is fully vaccinated, had a case of covid a few months ago, was started on paxlovid but couldn't tolerate the side effects including strong metallic taste in her mouth and urinary frequency.  It took a while for her to recover but she feels back to baseline now.  I feel she can wait for another booster until August/September in anticipation of a once a year vaccine covering the current strains in circulation, although we are waiting for official guidance on that.    She also had her left knee pop and produce pain and swelling after stepping down from an elevated tran at a restaurant.  The area showed quite a bit of bruising, as well.  She applied some ice packs which left some blisters, possibly a mild frostbite situation.  Currently, she has no pain or swelling in that knee and no difficulty ambulating.  At this point will just monitor.      Reviewed HCM; is willing to do cologuard test as opposed to colonoscopy, and will also schedule mammogram if ordered.    On exam, she is alert, in NAD.    /84 (BP Location: Right arm, Patient Position: Sitting, Cuff Size: Adult Large)   Pulse 91   Wt 90.6 kg (199 lb 12.8 oz)   SpO2 97%   BMI 37.14 kg/m     No further exam was done.    I spent 20 minutes with Olive 100% counseling and coordination of care with another 10 minutes chart review and documentation same day.    A/P 78 year old here for follow up    Special screening for malignant neoplasms, colon  -     COLOGUARD([employer]); Future    Encounter for screening mammogram for breast cancer  -     Mammogram, screening w linda (3D); Future    Generalized anxiety disorder  -   Refill  citalopram (CELEXA) 10 MG tablet; Take 1 tablet (10 mg) by mouth daily    Coby Mayes  MD [place]

## 2023-04-09 ENCOUNTER — HEALTH MAINTENANCE LETTER (OUTPATIENT)
Age: 78
End: 2023-04-09

## 2023-04-24 ENCOUNTER — TRANSFERRED RECORDS (OUTPATIENT)
Dept: HEALTH INFORMATION MANAGEMENT | Facility: CLINIC | Age: 78
End: 2023-04-24

## 2023-04-24 ENCOUNTER — LAB REQUISITION (OUTPATIENT)
Dept: LAB | Facility: CLINIC | Age: 78
End: 2023-04-24
Payer: COMMERCIAL

## 2023-04-24 LAB
C REACTIVE PROTEIN LHE: 0.1 MG/DL (ref 0–?)
ERYTHROCYTE [DISTWIDTH] IN BLOOD BY AUTOMATED COUNT: 15 % (ref 10–15)
ERYTHROCYTE [SEDIMENTATION RATE] IN BLOOD BY WESTERGREN METHOD: 8 MM/HR (ref 0–30)
HCT VFR BLD AUTO: 45.9 % (ref 35–47)
HGB BLD-MCNC: 14.9 G/DL (ref 11.7–15.7)
MCH RBC QN AUTO: 28.9 PG (ref 26.5–33)
MCHC RBC AUTO-ENTMCNC: 32.5 G/DL (ref 31.5–36.5)
MCV RBC AUTO: 89 FL (ref 78–100)
PLATELET # BLD AUTO: 240 10E3/UL (ref 150–450)
RBC # BLD AUTO: 5.16 10E6/UL (ref 3.8–5.2)
T3FREE SERPL-MCNC: 2.4 PG/ML (ref 2–4.4)
T4 FREE SERPL-MCNC: 0.89 NG/DL (ref 0.9–1.7)
TSH SERPL DL<=0.005 MIU/L-ACNC: 2.14 UIU/ML (ref 0.3–5)
WBC # BLD AUTO: 9 10E3/UL (ref 4–11)

## 2023-04-24 PROCEDURE — 84481 FREE ASSAY (FT-3): CPT | Mod: ORL | Performed by: OPHTHALMOLOGY

## 2023-04-24 PROCEDURE — 84445 ASSAY OF TSI GLOBULIN: CPT | Mod: ORL | Performed by: OPHTHALMOLOGY

## 2023-04-24 PROCEDURE — 85652 RBC SED RATE AUTOMATED: CPT | Mod: ORL | Performed by: OPHTHALMOLOGY

## 2023-04-24 PROCEDURE — 86800 THYROGLOBULIN ANTIBODY: CPT | Mod: ORL | Performed by: OPHTHALMOLOGY

## 2023-04-24 PROCEDURE — 84443 ASSAY THYROID STIM HORMONE: CPT | Mod: ORL | Performed by: OPHTHALMOLOGY

## 2023-04-24 PROCEDURE — 83519 RIA NONANTIBODY: CPT | Mod: ORL | Performed by: OPHTHALMOLOGY

## 2023-04-24 PROCEDURE — 36415 COLL VENOUS BLD VENIPUNCTURE: CPT | Mod: ORL | Performed by: OPHTHALMOLOGY

## 2023-04-24 PROCEDURE — 86140 C-REACTIVE PROTEIN: CPT | Mod: ORL | Performed by: OPHTHALMOLOGY

## 2023-04-24 PROCEDURE — 85027 COMPLETE CBC AUTOMATED: CPT | Mod: ORL | Performed by: OPHTHALMOLOGY

## 2023-04-24 PROCEDURE — 84439 ASSAY OF FREE THYROXINE: CPT | Mod: ORL | Performed by: OPHTHALMOLOGY

## 2023-04-25 LAB — THYROGLOB AB SERPL IA-ACNC: <20 IU/ML

## 2023-04-27 LAB
ACHR BIND AB SER-SCNC: 0 NMOL/L
MUSK AB SER-SCNC: 0 NMOL/L
TSI SER-ACNC: <1 TSI INDEX

## 2023-05-23 ENCOUNTER — HOSPITAL ENCOUNTER (OUTPATIENT)
Dept: MRI IMAGING | Facility: CLINIC | Age: 78
Discharge: HOME | End: 2023-05-23
Attending: OPHTHALMOLOGY | Admitting: OPHTHALMOLOGY
Payer: COMMERCIAL

## 2023-05-23 DIAGNOSIS — H50.00 ET (ESOTROPIA): ICD-10-CM

## 2023-05-23 PROCEDURE — 70553 MRI BRAIN STEM W/O & W/DYE: CPT

## 2023-05-23 PROCEDURE — 255N000002 HC RX 255 OP 636: Performed by: OPHTHALMOLOGY

## 2023-05-23 PROCEDURE — A9585 GADOBUTROL INJECTION: HCPCS | Performed by: OPHTHALMOLOGY

## 2023-05-23 RX ORDER — GADOBUTROL 604.72 MG/ML
9 INJECTION INTRAVENOUS ONCE
Status: COMPLETED | OUTPATIENT
Start: 2023-05-23 | End: 2023-05-23

## 2023-05-23 RX ADMIN — GADOBUTROL 9 ML: 604.72 INJECTION INTRAVENOUS at 14:44

## 2023-06-09 ENCOUNTER — TRANSFERRED RECORDS (OUTPATIENT)
Dept: HEALTH INFORMATION MANAGEMENT | Facility: CLINIC | Age: 78
End: 2023-06-09
Payer: COMMERCIAL

## 2023-10-13 ENCOUNTER — TRANSFERRED RECORDS (OUTPATIENT)
Dept: HEALTH INFORMATION MANAGEMENT | Facility: CLINIC | Age: 78
End: 2023-10-13
Payer: COMMERCIAL

## 2024-04-05 DIAGNOSIS — F41.1 GENERALIZED ANXIETY DISORDER: ICD-10-CM

## 2024-04-12 RX ORDER — CITALOPRAM HYDROBROMIDE 10 MG/1
10 TABLET ORAL DAILY
Qty: 90 TABLET | Refills: 0 | Status: SHIPPED | OUTPATIENT
Start: 2024-04-12 | End: 2024-07-16

## 2024-04-12 NOTE — TELEPHONE ENCOUNTER
citalopram (CELEXA) 10 MG tablet 90 tablet 3 4/7/2023  ----------------------  Last Office Visit : 4/7/2023  [facility] [place]     Coby Cerrato MD     Future Office visit:  0  ----------------------      Routing refill request to provider for review/approval because:  Overdue for annual follow-up visit, 90 day mauricio refill given with reminder to schedule.        Pass/Fail Protocol Criteria:  SSRIs Protocol Ubnusb1404/12/2024 08:45 AM   Protocol Details Recent (12 mo) or future (30 days) visit within the authorizing provider's specialty    Medication is active on med list    Patient is age 18 or older    No active pregnancy on record    No positive pregnancy test in last 12 months     Warnings Override History for citalopram (CELEXA) 10 MG tablet [[id number]]    Overridden by Coby Cerrato MD on Apr 7, 2023 9:56 AM   Drug-Drug   1. IBUPROFEN / SELECTIVE SEROTONIN REUPTAKE INHIBITORS [Level: Major]   Other Orders: ibuprofen (ADVIL/MOTRIN) 200 MG tablet      2. TRAMADOL / SELECTIVE SEROTONIN REUPTAKE INHIBITORS [Level: Major]   Other Orders: traMADol (ULTRAM) 50 MG tablet

## 2024-06-15 ENCOUNTER — HEALTH MAINTENANCE LETTER (OUTPATIENT)
Age: 79
End: 2024-06-15

## 2024-07-11 DIAGNOSIS — F41.1 GENERALIZED ANXIETY DISORDER: ICD-10-CM

## 2024-07-11 NOTE — TELEPHONE ENCOUNTER
citalopram (CELEXA) 10 MG tablet 90 tablet 0 4/12/2024 -- No   Sig - Route: Take 1 tablet (10 mg) by mouth daily     Last Office Visit :     4/7/2023  [facility] [place]  Future Office visit:     8/21/2024 12:00 PM (30 min)  [place]   Arrive by: 11:45 AM   [facility] PHYSICAL   [facility])   Coby Cerrato MD     ----------------------    Routing refill request to provider for review/approval because:  No MARY score on file  LOV 4/7/23    Pass/Fail Protocol Criteria:    SSRIs Protocol [id number]/11/2024 03:48 AM   Protocol Details MARY-7 score of less than 5 in past 6 months.    Medication is active on med list    Recent (12 mo) or future (90 days) visit within the authorizing provider's specialty    Medication indicated for associated diagnosis    Patient is age 18 or older    No active pregnancy on record    No positive pregnancy test in last 12 months

## 2024-07-16 RX ORDER — CITALOPRAM HYDROBROMIDE 10 MG/1
10 TABLET ORAL DAILY
Qty: 60 TABLET | Refills: 0 | Status: SHIPPED | OUTPATIENT
Start: 2024-07-16 | End: 2024-08-21

## 2024-08-13 SDOH — HEALTH STABILITY: PHYSICAL HEALTH: ON AVERAGE, HOW MANY MINUTES DO YOU ENGAGE IN EXERCISE AT THIS LEVEL?: 70 MIN

## 2024-08-13 SDOH — HEALTH STABILITY: PHYSICAL HEALTH: ON AVERAGE, HOW MANY DAYS PER WEEK DO YOU ENGAGE IN MODERATE TO STRENUOUS EXERCISE (LIKE A BRISK WALK)?: 6 DAYS

## 2024-08-13 ASSESSMENT — SOCIAL DETERMINANTS OF HEALTH (SDOH): HOW OFTEN DO YOU GET TOGETHER WITH FRIENDS OR RELATIVES?: MORE THAN THREE TIMES A WEEK

## 2024-08-21 ENCOUNTER — LAB (OUTPATIENT)
Dept: LAB | Facility: CLINIC | Age: 79
End: 2024-08-21
Payer: COMMERCIAL

## 2024-08-21 ENCOUNTER — OFFICE VISIT (OUTPATIENT)
Dept: INTERNAL MEDICINE | Facility: CLINIC | Age: 79
End: 2024-08-21
Payer: COMMERCIAL

## 2024-08-21 VITALS
SYSTOLIC BLOOD PRESSURE: 123 MMHG | HEART RATE: 82 BPM | HEIGHT: 61 IN | DIASTOLIC BLOOD PRESSURE: 77 MMHG | OXYGEN SATURATION: 97 % | WEIGHT: 191.5 LBS | BODY MASS INDEX: 36.15 KG/M2

## 2024-08-21 DIAGNOSIS — D64.9 FATIGUE ASSOCIATED WITH ANEMIA: ICD-10-CM

## 2024-08-21 DIAGNOSIS — R53.83 OTHER FATIGUE: ICD-10-CM

## 2024-08-21 DIAGNOSIS — Z12.31 VISIT FOR SCREENING MAMMOGRAM: ICD-10-CM

## 2024-08-21 DIAGNOSIS — R41.3 MEMORY CHANGE: ICD-10-CM

## 2024-08-21 DIAGNOSIS — Z12.11 SCREEN FOR COLON CANCER: Primary | ICD-10-CM

## 2024-08-21 DIAGNOSIS — F41.1 GENERALIZED ANXIETY DISORDER: ICD-10-CM

## 2024-08-21 DIAGNOSIS — Z00.00 ROUTINE GENERAL MEDICAL EXAMINATION AT A HEALTH CARE FACILITY: ICD-10-CM

## 2024-08-21 LAB
ALBUMIN SERPL BCG-MCNC: 3.9 G/DL (ref 3.5–5.2)
ALP SERPL-CCNC: 69 U/L (ref 40–150)
ALT SERPL W P-5'-P-CCNC: 9 U/L (ref 0–50)
ANION GAP SERPL CALCULATED.3IONS-SCNC: 11 MMOL/L (ref 7–15)
AST SERPL W P-5'-P-CCNC: 24 U/L (ref 0–45)
BILIRUB SERPL-MCNC: 0.5 MG/DL
BUN SERPL-MCNC: 15.3 MG/DL (ref 8–23)
CALCIUM SERPL-MCNC: 9.2 MG/DL (ref 8.8–10.4)
CHLORIDE SERPL-SCNC: 104 MMOL/L (ref 98–107)
CHOLEST SERPL-MCNC: 276 MG/DL
CREAT SERPL-MCNC: 0.9 MG/DL (ref 0.51–0.95)
EGFRCR SERPLBLD CKD-EPI 2021: 66 ML/MIN/1.73M2
ERYTHROCYTE [DISTWIDTH] IN BLOOD BY AUTOMATED COUNT: 14.6 % (ref 10–15)
FASTING STATUS PATIENT QL REPORTED: NO
FASTING STATUS PATIENT QL REPORTED: NO
FERRITIN SERPL-MCNC: 56 NG/ML (ref 11–328)
GLUCOSE SERPL-MCNC: 102 MG/DL (ref 70–99)
HCO3 SERPL-SCNC: 26 MMOL/L (ref 22–29)
HCT VFR BLD AUTO: 46.9 % (ref 35–47)
HDLC SERPL-MCNC: 64 MG/DL
HGB BLD-MCNC: 15.5 G/DL (ref 11.7–15.7)
LDLC SERPL CALC-MCNC: 182 MG/DL
MCH RBC QN AUTO: 29.1 PG (ref 26.5–33)
MCHC RBC AUTO-ENTMCNC: 33 G/DL (ref 31.5–36.5)
MCV RBC AUTO: 88 FL (ref 78–100)
NONHDLC SERPL-MCNC: 212 MG/DL
PLATELET # BLD AUTO: 248 10E3/UL (ref 150–450)
POTASSIUM SERPL-SCNC: 4.4 MMOL/L (ref 3.4–5.3)
PROT SERPL-MCNC: 7.1 G/DL (ref 6.4–8.3)
RBC # BLD AUTO: 5.32 10E6/UL (ref 3.8–5.2)
SODIUM SERPL-SCNC: 141 MMOL/L (ref 135–145)
TRIGL SERPL-MCNC: 149 MG/DL
TSH SERPL DL<=0.005 MIU/L-ACNC: 1.96 UIU/ML (ref 0.3–4.2)
WBC # BLD AUTO: 8.2 10E3/UL (ref 4–11)

## 2024-08-21 PROCEDURE — 85027 COMPLETE CBC AUTOMATED: CPT | Performed by: PATHOLOGY

## 2024-08-21 PROCEDURE — G0439 PPPS, SUBSEQ VISIT: HCPCS | Performed by: INTERNAL MEDICINE

## 2024-08-21 PROCEDURE — 80061 LIPID PANEL: CPT | Performed by: PATHOLOGY

## 2024-08-21 PROCEDURE — 36415 COLL VENOUS BLD VENIPUNCTURE: CPT | Performed by: PATHOLOGY

## 2024-08-21 PROCEDURE — 84443 ASSAY THYROID STIM HORMONE: CPT | Performed by: PATHOLOGY

## 2024-08-21 PROCEDURE — 82728 ASSAY OF FERRITIN: CPT | Performed by: PATHOLOGY

## 2024-08-21 PROCEDURE — 80053 COMPREHEN METABOLIC PANEL: CPT | Performed by: PATHOLOGY

## 2024-08-21 RX ORDER — CITALOPRAM HYDROBROMIDE 10 MG/1
10 TABLET ORAL DAILY
Qty: 90 TABLET | Refills: 3 | Status: SHIPPED | OUTPATIENT
Start: 2024-08-21

## 2024-08-21 ASSESSMENT — ANXIETY QUESTIONNAIRES
6. BECOMING EASILY ANNOYED OR IRRITABLE: SEVERAL DAYS
3. WORRYING TOO MUCH ABOUT DIFFERENT THINGS: SEVERAL DAYS
5. BEING SO RESTLESS THAT IT IS HARD TO SIT STILL: NOT AT ALL
GAD7 TOTAL SCORE: 2
4. TROUBLE RELAXING: NOT AT ALL
GAD7 TOTAL SCORE: 2
7. FEELING AFRAID AS IF SOMETHING AWFUL MIGHT HAPPEN: NOT AT ALL
8. IF YOU CHECKED OFF ANY PROBLEMS, HOW DIFFICULT HAVE THESE MADE IT FOR YOU TO DO YOUR WORK, TAKE CARE OF THINGS AT HOME, OR GET ALONG WITH OTHER PEOPLE?: NOT DIFFICULT AT ALL
2. NOT BEING ABLE TO STOP OR CONTROL WORRYING: NOT AT ALL
1. FEELING NERVOUS, ANXIOUS, OR ON EDGE: NOT AT ALL
IF YOU CHECKED OFF ANY PROBLEMS ON THIS QUESTIONNAIRE, HOW DIFFICULT HAVE THESE PROBLEMS MADE IT FOR YOU TO DO YOUR WORK, TAKE CARE OF THINGS AT HOME, OR GET ALONG WITH OTHER PEOPLE: NOT DIFFICULT AT ALL
GAD7 TOTAL SCORE: 2
7. FEELING AFRAID AS IF SOMETHING AWFUL MIGHT HAPPEN: NOT AT ALL

## 2024-08-21 NOTE — PATIENT INSTRUCTIONS
You may want to consider getting the following vaccines from your local pharmacy:    Zoster (shingles)  Covid and flu updates in the fall

## 2024-08-21 NOTE — PROGRESS NOTES
Olive was seen today for physical/wellness visit.  She feels overall she is enjoying good health, however, she is concerned about fatigue and a lack of energy to do the things she used to do in the course of a day such as completing yardwork.  However she is quite active and engaging in many activities including exercise in the form of water aerobics. She also noted memory change, specifically word finding difficulty.  She found that both the fatigue and the memory changes were reversed when she took a friend's phentermine for 5 days or so.  Olive also noticed a lump on the left side of her neck that she wanted me to check; it was a normal, pea size, freely mobile lymph node in the posterior cervical chain.  Reassurance was given, she will reach out if it changes or begins to enlarge.    No changes to past, family or social history and  ROS: 10 point ROS neg other than the symptoms noted above in the HPI. Physical exam as listed below was unremarkable.     A/P 79 year old here for routine PE/wellness visit.  I recommend a panel of labs and also neuropsych testing given fatigue and memory change. Refilled citalopram as this seems to be helpful and producing no side effects. When she stopped it briefly she did not notice any changes in terms of the level of fatigue.  I reviewed HCM; due for mammogram and colonoscopy; can obtain vaccine update at her local pharmacy.      Screen for colon cancer  -     Colonoscopy Screening  Referral; Future    Visit for screening mammogram  -     MA Screening Bilateral w/ Lucas; Future    Generalized anxiety disorder  -     citalopram (CELEXA) 10 MG tablet; Take 1 tablet (10 mg) by mouth daily. Please keep upcoming appointment for further refills.    Memory change  -     Adult Neuropsychology  Referral; Future    Other fatigue  -     CBC with platelets; Future  -     TSH with free T4 reflex; Future  -     Ferritin; Future  -     Comprehensive metabolic panel (BMP + Alb,  Alk Phos, ALT, AST, Total. Bili, TP); Future  -     Lipid Profile FASTING; Future      Coby Menjivar MD           Preventive Care Visit  [facility]  Coby Menjivar MD, Internal Medicine  Aug 21, 2024        Dorothy Schaefer is a 79 year old, presenting for the following:  Physical (Slight vertigo, lump on back of neck, fatigue)        8/21/2024    11:51 AM   Additional Questions   Roomed by [facility] EMT     Health Care Directive  Patient does not have a Health Care Directive or Living Will:     HPI        8/13/2024   General Health   How would you rate your overall physical health? Good   Feel stress (tense, anxious, or unable to sleep) Only a little            8/13/2024   Nutrition   Diet: Regular (no restrictions)            8/13/2024   Exercise   Days per week of moderate/strenous exercise 6 days   Average minutes spent exercising at this level 70 min            8/13/2024   Social Factors   Frequency of gathering with friends or relatives More than three times a week   Worry food won't last until get money to buy more No   Food not last or not have enough money for food? No   Do you have housing? (Housing is defined as stable permanent housing and does not include staying ouside in a car, in a tent, in an abandoned building, in an overnight shelter, or couch-surfing.) Yes   Are you worried about losing your housing? No   Lack of transportation? No   Unable to get utilities (heat,electricity)? No            8/13/2024   Fall Risk   Fallen 2 or more times in the past year? Yes   Trouble with walking or balance? Yes             8/13/2024   Activities of Daily Living- Home Safety   Needs help with the following daily activites None of the above   Safety concerns in the home None of the above            8/13/2024   Dental   Dentist two times every year? Yes            8/13/2024   Hearing Screening   Hearing concerns? (!) I FEEL THAT PEOPLE ARE MUMBLING OR NOT  SPEAKING CLEARLY.    (!) IT'S HARD TO FOLLOW A CONVERSATION IN A NOISY RESTAURANT OR CROWDED ROOM.       Multiple values from one day are sorted in reverse-chronological order           8/13/2024   Urinary Incontinence Screening   Bothered by leaking urine in past 6 months No            8/13/2024   TB Screening   Were you born outside of the [place]? No            Today's PHQ-2 Score:       8/21/2024    11:56 AM   PHQ-2 ( 1999 [employer])   Q1: Little interest or pleasure in doing things 0   Q2: Feeling down, depressed or hopeless 0   PHQ-2 Score 0   Q1: Little interest or pleasure in doing things Not at all   Q2: Feeling down, depressed or hopeless Not at all   PHQ-2 Score 0           8/13/2024   Substance Use   Alcohol more than 3/day or more than 7/wk No   Do you have a current opioid prescription? No   How severe/bad is pain from 1 to 10? 5/10   Do you use any other substances recreationally? No        Social History     Tobacco Use    Smoking status: Never    Smokeless tobacco: Never   Substance Use Topics    Alcohol use: Yes     Comment: Occasionally    Drug use: Not Currently              ASCVD Risk   The ASCVD Risk score (Agustin AYERS, et al., 2019) failed to calculate for the following reasons:    The valid systolic blood pressure range is 90 to 200 mmHg          Reviewed and updated as needed this visit by Provider                      Current providers sharing in care for this patient include:  Patient Care Team:  Coby Cerrato MD as PCP - General (Internal Medicine)  Coby Cerrato MD as Assigned PCP  Goivanna Arellano MD as Resident (Internal Medicine)    The following health maintenance items are reviewed in Epic and correct as of today:  Health Maintenance   Topic Date Due    ANNUAL REVIEW OF HM ORDERS  Never done    ADVANCE CARE PLANNING  Never done    ZOSTER IMMUNIZATION (1 of 2) Never done    COLORECTAL CANCER SCREENING  03/18/2020    MAMMO SCREENING  11/11/2020    MEDICARE  "ANNUAL WELLNESS VISIT  01/21/2023    COVID-19 Vaccine (6 - 2023-24 season) 03/14/2024    INFLUENZA VACCINE (1) 09/01/2024    GLUCOSE  01/21/2025    FALL RISK ASSESSMENT  08/21/2025    LIPID  01/21/2027    DTAP/TDAP/TD IMMUNIZATION (2 - Td or Tdap) 04/25/2027    DEXA  07/05/2034    HEPATITIS C SCREENING  Completed    PHQ-2 (once per calendar year)  Completed    Pneumococcal Vaccine: 65+ Years  Completed    RSV VACCINE (Pregnancy & 60+)  Completed    HPV IMMUNIZATION  Aged Out    MENINGITIS IMMUNIZATION  Aged Out    RSV MONOCLONAL ANTIBODY  Aged Out            Objective    Exam  BP (!) 79/46 (BP Location: Left arm, Patient Position: Sitting, Cuff Size: Adult Large)   Pulse 82   Ht 1.562 m (5' 1.5\")   Wt 86.9 kg (191 lb 8 oz)   SpO2 97%   BMI 35.60 kg/m     Estimated body mass index is 35.6 kg/m  as calculated from the following:    Height as of this encounter: 1.562 m (5' 1.5\").    Weight as of this encounter: 86.9 kg (191 lb 8 oz).    Physical Exam  GENERAL: alert and no distress  EYES: Eyes grossly normal to inspection, PERRL and conjunctivae and sclerae normal  HENT: ear canals and TM's normal, nose and mouth without ulcers or lesions  NECK: no adenopathy, no asymmetry, masses, or scars  RESP: lungs clear to auscultation - no rales, rhonchi or wheezes  CV: regular rate and rhythm, normal S1 S2, no S3 or S4, no murmur, click or rub, no peripheral edema  ABDOMEN: soft, nontender, no hepatosplenomegaly, no masses and bowel sounds normal  MS: no gross musculoskeletal defects noted, no edema  SKIN: no suspicious lesions or rashes  NEURO: Normal strength and tone, mentation intact and speech normal  PSYCH: mentation appears normal, affect normal/bright        8/21/2024   Mini Cog   Clock Draw Score 2 Normal   3 Item Recall 3 objects recalled   Mini Cog Total Score 5                 Signed Electronically by: Coby Menjivar MD    Answers submitted by the patient for this visit:  Patient Health Questionnaire " (G7) (Submitted on 8/21/2024)  MARY 7 TOTAL SCORE: 2

## 2024-08-22 ENCOUNTER — TELEPHONE (OUTPATIENT)
Dept: INTERNAL MEDICINE | Facility: CLINIC | Age: 79
End: 2024-08-22
Payer: COMMERCIAL

## 2024-08-22 NOTE — TELEPHONE ENCOUNTER
Left Voicemail (1st Attempt) and Sent Mychart (1st Attempt) for the patient to call back and schedule the following:    Appointment type: mammogram  Provider: per PCP  Return date: any  Specialty phone number: [phone]

## 2024-09-10 ENCOUNTER — TELEPHONE (OUTPATIENT)
Dept: INTERNAL MEDICINE | Facility: CLINIC | Age: 79
End: 2024-09-10
Payer: COMMERCIAL

## 2024-09-10 NOTE — TELEPHONE ENCOUNTER
Left Voicemail (2nd Attempt) for the patient to call back and schedule the following:    Appointment type: mammo  Provider: per PCP  Return date: any  Specialty phone number: [phone]

## 2024-10-15 ENCOUNTER — ANCILLARY PROCEDURE (OUTPATIENT)
Dept: MAMMOGRAPHY | Facility: CLINIC | Age: 79
End: 2024-10-15
Attending: INTERNAL MEDICINE
Payer: COMMERCIAL

## 2024-10-15 DIAGNOSIS — Z12.31 VISIT FOR SCREENING MAMMOGRAM: ICD-10-CM

## 2024-10-15 PROCEDURE — 77063 BREAST TOMOSYNTHESIS BI: CPT | Mod: GC

## 2024-10-15 PROCEDURE — 77067 SCR MAMMO BI INCL CAD: CPT | Mod: GC

## 2025-03-12 ENCOUNTER — TELEPHONE (OUTPATIENT)
Dept: INTERNAL MEDICINE | Facility: CLINIC | Age: 80
End: 2025-03-12
Payer: COMMERCIAL

## 2025-03-12 NOTE — TELEPHONE ENCOUNTER
Left Voicemail (1st Attempt) and Sent Mychart (1st Attempt) for the patient to call back and schedule the following:    Appointment type: [facility] PHYSICAL  Provider: PCP  Return date: Return in about 1 year (around 8/21/2025) for Routine preventive.   Specialty phone number: [phone]  Additional appointment(s) needed: -  Additonal Notes: Return in about 1 year (around 8/21/2025) for Routine preventive.

## 2025-03-20 ENCOUNTER — TRANSFERRED RECORDS (OUTPATIENT)
Dept: HEALTH INFORMATION MANAGEMENT | Facility: CLINIC | Age: 80
End: 2025-03-20
Payer: COMMERCIAL

## 2025-08-17 SDOH — HEALTH STABILITY: PHYSICAL HEALTH: ON AVERAGE, HOW MANY DAYS PER WEEK DO YOU ENGAGE IN MODERATE TO STRENUOUS EXERCISE (LIKE A BRISK WALK)?: 6 DAYS

## 2025-08-17 SDOH — HEALTH STABILITY: PHYSICAL HEALTH: ON AVERAGE, HOW MANY MINUTES DO YOU ENGAGE IN EXERCISE AT THIS LEVEL?: 50 MIN

## 2025-08-17 ASSESSMENT — SOCIAL DETERMINANTS OF HEALTH (SDOH): HOW OFTEN DO YOU GET TOGETHER WITH FRIENDS OR RELATIVES?: MORE THAN THREE TIMES A WEEK

## 2025-08-22 ENCOUNTER — OFFICE VISIT (OUTPATIENT)
Dept: INTERNAL MEDICINE | Facility: CLINIC | Age: 80
End: 2025-08-22
Payer: COMMERCIAL

## 2025-08-22 VITALS
DIASTOLIC BLOOD PRESSURE: 78 MMHG | SYSTOLIC BLOOD PRESSURE: 128 MMHG | WEIGHT: 185.9 LBS | HEART RATE: 87 BPM | RESPIRATION RATE: 18 BRPM | BODY MASS INDEX: 35.1 KG/M2 | TEMPERATURE: 98.2 F | OXYGEN SATURATION: 98 % | HEIGHT: 61 IN

## 2025-08-22 DIAGNOSIS — Z12.11 SCREEN FOR COLON CANCER: Primary | ICD-10-CM

## 2025-08-22 DIAGNOSIS — H91.93 CHANGE IN HEARING, BILATERAL: ICD-10-CM

## 2025-08-22 DIAGNOSIS — F41.1 GENERALIZED ANXIETY DISORDER: ICD-10-CM

## 2025-08-22 DIAGNOSIS — R41.3 MEMORY CHANGE: ICD-10-CM

## 2025-08-22 DIAGNOSIS — R73.09 OTHER ABNORMAL GLUCOSE: ICD-10-CM

## 2025-08-22 DIAGNOSIS — R53.83 OTHER FATIGUE: ICD-10-CM

## 2025-08-22 DIAGNOSIS — E78.00 ELEVATED CHOLESTEROL: ICD-10-CM

## 2025-08-22 DIAGNOSIS — R29.6 FALLS FREQUENTLY: ICD-10-CM

## 2025-08-22 DIAGNOSIS — Z00.00 ROUTINE GENERAL MEDICAL EXAMINATION AT A HEALTH CARE FACILITY: ICD-10-CM

## 2025-08-22 PROCEDURE — G0439 PPPS, SUBSEQ VISIT: HCPCS | Performed by: INTERNAL MEDICINE

## 2025-08-22 PROCEDURE — 3078F DIAST BP <80 MM HG: CPT | Performed by: INTERNAL MEDICINE

## 2025-08-22 PROCEDURE — 3074F SYST BP LT 130 MM HG: CPT | Performed by: INTERNAL MEDICINE

## 2025-08-22 PROCEDURE — 3050F LDL-C >= 130 MG/DL: CPT | Performed by: INTERNAL MEDICINE

## 2025-08-22 PROCEDURE — 3044F HG A1C LEVEL LT 7.0%: CPT | Performed by: INTERNAL MEDICINE

## 2025-08-22 RX ORDER — CITALOPRAM HYDROBROMIDE 10 MG/1
10 TABLET ORAL DAILY
Qty: 90 TABLET | Refills: 3 | Status: SHIPPED | OUTPATIENT
Start: 2025-08-22

## 2025-08-25 ENCOUNTER — PATIENT OUTREACH (OUTPATIENT)
Dept: CARE COORDINATION | Facility: CLINIC | Age: 80
End: 2025-08-25
Payer: COMMERCIAL

## 2025-08-26 ENCOUNTER — LAB (OUTPATIENT)
Dept: LAB | Facility: CLINIC | Age: 80
End: 2025-08-26
Payer: COMMERCIAL

## 2025-08-27 ENCOUNTER — PATIENT OUTREACH (OUTPATIENT)
Dept: CARE COORDINATION | Facility: CLINIC | Age: 80
End: 2025-08-27
Payer: COMMERCIAL